# Patient Record
Sex: FEMALE | Race: OTHER | NOT HISPANIC OR LATINO
[De-identification: names, ages, dates, MRNs, and addresses within clinical notes are randomized per-mention and may not be internally consistent; named-entity substitution may affect disease eponyms.]

---

## 2018-03-15 ENCOUNTER — LABORATORY RESULT (OUTPATIENT)
Age: 33
End: 2018-03-15

## 2018-03-15 ENCOUNTER — APPOINTMENT (OUTPATIENT)
Dept: INTERNAL MEDICINE | Facility: CLINIC | Age: 33
End: 2018-03-15
Payer: COMMERCIAL

## 2018-03-15 VITALS
DIASTOLIC BLOOD PRESSURE: 64 MMHG | SYSTOLIC BLOOD PRESSURE: 106 MMHG | HEART RATE: 78 BPM | WEIGHT: 129 LBS | TEMPERATURE: 98.4 F | HEIGHT: 62.5 IN | BODY MASS INDEX: 23.14 KG/M2 | OXYGEN SATURATION: 98 %

## 2018-03-15 DIAGNOSIS — F15.90 OTHER STIMULANT USE, UNSPECIFIED, UNCOMPLICATED: ICD-10-CM

## 2018-03-15 DIAGNOSIS — Z80.3 FAMILY HISTORY OF MALIGNANT NEOPLASM OF BREAST: ICD-10-CM

## 2018-03-15 DIAGNOSIS — Z00.00 ENCOUNTER FOR GENERAL ADULT MEDICAL EXAMINATION W/OUT ABNORMAL FINDINGS: ICD-10-CM

## 2018-03-15 DIAGNOSIS — Z78.9 OTHER SPECIFIED HEALTH STATUS: ICD-10-CM

## 2018-03-15 DIAGNOSIS — Z82.49 FAMILY HISTORY OF ISCHEMIC HEART DISEASE AND OTHER DISEASES OF THE CIRCULATORY SYSTEM: ICD-10-CM

## 2018-03-15 DIAGNOSIS — Z83.3 FAMILY HISTORY OF DIABETES MELLITUS: ICD-10-CM

## 2018-03-15 PROCEDURE — 99385 PREV VISIT NEW AGE 18-39: CPT

## 2018-03-16 LAB
25(OH)D3 SERPL-MCNC: 25.7 NG/ML
ALBUMIN SERPL ELPH-MCNC: 3.7 G/DL
ALP BLD-CCNC: 68 U/L
ALT SERPL-CCNC: 14 U/L
ANION GAP SERPL CALC-SCNC: 15 MMOL/L
APPEARANCE: CLEAR
AST SERPL-CCNC: 16 U/L
BASOPHILS # BLD AUTO: 0.01 K/UL
BASOPHILS NFR BLD AUTO: 0.1 %
BILIRUB SERPL-MCNC: 0.2 MG/DL
BILIRUBIN URINE: NEGATIVE
BLOOD URINE: ABNORMAL
BUN SERPL-MCNC: 13 MG/DL
CALCIUM SERPL-MCNC: 9.2 MG/DL
CHLORIDE SERPL-SCNC: 98 MMOL/L
CHOLEST SERPL-MCNC: 172 MG/DL
CHOLEST/HDLC SERPL: 3 RATIO
CO2 SERPL-SCNC: 23 MMOL/L
COLOR: YELLOW
CREAT SERPL-MCNC: 0.79 MG/DL
EOSINOPHIL # BLD AUTO: 0.07 K/UL
EOSINOPHIL NFR BLD AUTO: 0.8 %
GLUCOSE QUALITATIVE U: NEGATIVE MG/DL
GLUCOSE SERPL-MCNC: 88 MG/DL
HAV IGG+IGM SER QL: REACTIVE
HBV SURFACE AB SER QL: REACTIVE
HBV SURFACE AG SER QL: NONREACTIVE
HCT VFR BLD CALC: 37.3 %
HDLC SERPL-MCNC: 57 MG/DL
HGB BLD-MCNC: 12.5 G/DL
HIV1+2 AB SPEC QL IA.RAPID: NONREACTIVE
IMM GRANULOCYTES NFR BLD AUTO: 0.2 %
KETONES URINE: NEGATIVE
LDLC SERPL CALC-MCNC: 86 MG/DL
LEUKOCYTE ESTERASE URINE: NEGATIVE
LYMPHOCYTES # BLD AUTO: 3.5 K/UL
LYMPHOCYTES NFR BLD AUTO: 39.5 %
MAN DIFF?: NORMAL
MCHC RBC-ENTMCNC: 29.2 PG
MCHC RBC-ENTMCNC: 33.5 GM/DL
MCV RBC AUTO: 87.1 FL
MEV IGG FLD QL IA: 57.2 AU/ML
MEV IGG+IGM SER-IMP: POSITIVE
MONOCYTES # BLD AUTO: 0.49 K/UL
MONOCYTES NFR BLD AUTO: 5.5 %
MUV AB SER-ACNC: NEGATIVE
MUV IGG SER QL IA: <5 AU/ML
NEUTROPHILS # BLD AUTO: 4.77 K/UL
NEUTROPHILS NFR BLD AUTO: 53.9 %
NITRITE URINE: NEGATIVE
PH URINE: 6
PLATELET # BLD AUTO: 349 K/UL
POTASSIUM SERPL-SCNC: 4.4 MMOL/L
PROT SERPL-MCNC: 7.5 G/DL
PROTEIN URINE: NEGATIVE MG/DL
RBC # BLD: 4.28 M/UL
RBC # FLD: 12.9 %
RUBV IGG FLD-ACNC: 12.4 INDEX
RUBV IGG SER-IMP: POSITIVE
SODIUM SERPL-SCNC: 136 MMOL/L
SPECIFIC GRAVITY URINE: 1.02
T PALLIDUM AB SER QL IA: NEGATIVE
TRIGL SERPL-MCNC: 143 MG/DL
UROBILINOGEN URINE: NEGATIVE MG/DL
VZV AB TITR SER: NEGATIVE
VZV IGG SER IF-ACNC: 29.7 INDEX
WBC # FLD AUTO: 8.86 K/UL

## 2018-03-19 LAB
ADJUSTED MITOGEN: >10 IU/ML
ADJUSTED TB AG: 0.01 IU/ML
M TB IFN-G BLD-IMP: NEGATIVE
QUANTIFERON GOLD NIL: 0.05 IU/ML

## 2018-03-28 ENCOUNTER — FORM ENCOUNTER (OUTPATIENT)
Age: 33
End: 2018-03-28

## 2018-03-29 ENCOUNTER — APPOINTMENT (OUTPATIENT)
Dept: ULTRASOUND IMAGING | Facility: CLINIC | Age: 33
End: 2018-03-29
Payer: COMMERCIAL

## 2018-03-29 ENCOUNTER — OUTPATIENT (OUTPATIENT)
Dept: OUTPATIENT SERVICES | Facility: HOSPITAL | Age: 33
LOS: 1 days | End: 2018-03-29
Payer: COMMERCIAL

## 2018-03-29 DIAGNOSIS — Z00.8 ENCOUNTER FOR OTHER GENERAL EXAMINATION: ICD-10-CM

## 2018-03-29 DIAGNOSIS — N63.0 UNSPECIFIED LUMP IN UNSPECIFIED BREAST: ICD-10-CM

## 2018-03-29 PROCEDURE — 76641 ULTRASOUND BREAST COMPLETE: CPT | Mod: 26,50

## 2018-03-29 PROCEDURE — 76641 ULTRASOUND BREAST COMPLETE: CPT

## 2018-04-11 ENCOUNTER — RESULT REVIEW (OUTPATIENT)
Age: 33
End: 2018-04-11

## 2018-04-11 ENCOUNTER — APPOINTMENT (OUTPATIENT)
Dept: BREAST CENTER | Facility: CLINIC | Age: 33
End: 2018-04-11
Payer: COMMERCIAL

## 2018-04-11 VITALS
HEART RATE: 77 BPM | DIASTOLIC BLOOD PRESSURE: 65 MMHG | BODY MASS INDEX: 24.48 KG/M2 | SYSTOLIC BLOOD PRESSURE: 98 MMHG | HEIGHT: 62 IN | TEMPERATURE: 98.9 F | OXYGEN SATURATION: 99 % | WEIGHT: 133 LBS

## 2018-04-11 DIAGNOSIS — Z80.3 FAMILY HISTORY OF MALIGNANT NEOPLASM OF BREAST: ICD-10-CM

## 2018-04-11 DIAGNOSIS — Z78.9 OTHER SPECIFIED HEALTH STATUS: ICD-10-CM

## 2018-04-11 PROCEDURE — 99243 OFF/OP CNSLTJ NEW/EST LOW 30: CPT

## 2018-06-19 ENCOUNTER — OTHER (OUTPATIENT)
Age: 33
End: 2018-06-19

## 2018-07-12 ENCOUNTER — APPOINTMENT (OUTPATIENT)
Dept: INTERNAL MEDICINE | Facility: CLINIC | Age: 33
End: 2018-07-12

## 2018-10-11 ENCOUNTER — APPOINTMENT (OUTPATIENT)
Dept: INTERNAL MEDICINE | Facility: CLINIC | Age: 33
End: 2018-10-11
Payer: COMMERCIAL

## 2018-10-11 ENCOUNTER — APPOINTMENT (OUTPATIENT)
Dept: NEUROLOGY | Facility: CLINIC | Age: 33
End: 2018-10-11
Payer: COMMERCIAL

## 2018-10-11 VITALS
HEART RATE: 77 BPM | BODY MASS INDEX: 24.84 KG/M2 | DIASTOLIC BLOOD PRESSURE: 60 MMHG | WEIGHT: 135 LBS | TEMPERATURE: 98.6 F | SYSTOLIC BLOOD PRESSURE: 100 MMHG | HEIGHT: 62 IN | OXYGEN SATURATION: 98 %

## 2018-10-11 VITALS
WEIGHT: 135 LBS | HEART RATE: 77 BPM | OXYGEN SATURATION: 98 % | TEMPERATURE: 98.6 F | DIASTOLIC BLOOD PRESSURE: 60 MMHG | BODY MASS INDEX: 24.69 KG/M2 | SYSTOLIC BLOOD PRESSURE: 100 MMHG

## 2018-10-11 DIAGNOSIS — Z84.89 FAMILY HISTORY OF OTHER SPECIFIED CONDITIONS: ICD-10-CM

## 2018-10-11 PROCEDURE — 99203 OFFICE O/P NEW LOW 30 MIN: CPT

## 2018-10-11 PROCEDURE — 99215 OFFICE O/P EST HI 40 MIN: CPT

## 2018-10-11 NOTE — ASSESSMENT
[FreeTextEntry1] : pt has worsening of her headaches and they are new onset  she will have  labs evaluated .she doesn’t have any SNOOP signs  and has recent eye exam as well.  She likely has cluster headaches  she does have tearing of her eyes   as well .  She is not having symptoms of a migraine  , she doesn’t have papilledema   or signs of  hemorrhage.  she should have  mri with and with out contrast for evaluation and if needed mra  to be sure it is not an aneurysm.  she is to go to er if her headache worsens.  Headache is among the most common medical complaints. An overview of the approach to the patient with a chief complaint of headache is presented here. The approach to adults presenting with headache in the emergency department is reviewed elsewhere. (See "Evaluation of the adult with nontraumatic headache in the emergency department".)\par \par The clinical features and management of specific primary headache syndromes are discussed separately. (See "Pathophysiology, clinical manifestations, and diagnosis of migraine in adults" and "Tension-type headache in adults: Pathophysiology, clinical features, and diagnosis" and "Cluster headache: Epidemiology, clinical features, and diagnosis".) \par \par EPIDEMIOLOGY AND CLASSIFICATION — As many as 90 percent of all primary headaches fall under a few categories, including migraine, tension-type, and cluster headache. While episodic tension-type headache (TTH) is the most frequent headache type in population-based studies, migraine is the most common diagnosis in patients presenting to primary care physicians with headache. The one-year prevalence of episodic TTH is approximately 65 percent (see "Tension-type headache in adults: Pathophysiology, clinical features, and diagnosis", section on 'Epidemiology'), but most people with TTH do not present to clinicians for care. As an example, a study of two primary care units in [place] found that migraine was the most prevalent primary headache disorder, accounting for 45 percent of patients reporting headache as a single symptom [1]. Similarly, in the multinational [place] study, which evaluated 1203 patients visiting a primary practitioner with a complaint of episodic headache, migraine was present in 94 percent of the subjects, while TTH, by definition episodic, was present in 3 percent [2].\par \par Cluster headache typically leads to significant disability and most of these patients will come to medical attention. However, cluster headache remains an uncommon diagnosis in primary care settings because of overall low prevalence in the general population (<1 percent). (See "Cluster headache: Epidemiology, clinical features, and diagnosis", section on 'Epidemiology'.)\par \par Clinicians can easily become familiar with the most common primary headache disorders and how to distinguish them (table 1).\par \par Migraine — Migraine is a disorder of recurrent attacks. The headache of migraine is often but not always unilateral and tends to have a throbbing or pulsatile quality. Accompanying features may include nausea, vomiting, photophobia, or phonophobia during attacks (table 2). (See "Pathophysiology, clinical manifestations, and diagnosis of migraine in adults".)\par \par Migraine trigger factors (table 3) may include stress, menstruation, visual stimuli, weather changes, nitrates, fasting, wine, sleep disturbances, and aspartame, among others. (See "Pathophysiology, clinical manifestations, and diagnosis of migraine in adults", section on 'Precipitating and exacerbating factors'.)\par \par Tension-type headache — The typical presentation of a TTH attack is that of a mild to moderate intensity, bilateral, nonthrobbing headache without other associated features. Pure TTH is a rather featureless headache. (See "Tension-type headache in adults: Pathophysiology, clinical features, and diagnosis".)\par \par Cluster headache — Cluster headache belongs to a group of idiopathic headache entities, the trigeminal autonomic cephalalgias (table 4), all of which involve unilateral, often severe headache attacks and typical accompanying autonomic symptoms. Cluster headache is characterized by attacks of severe unilateral orbital, supraorbital, or temporal pain accompanied by autonomic phenomena. Unilateral autonomic symptoms are ipsilateral to the pain and may include ptosis, miosis, lacrimation, conjunctival injection, rhinorrhea, and nasal congestion. Attacks usually last 15 to 180 minutes. (See "Cluster headache: Epidemiology, clinical features, and diagnosis".)\par \par Cluster headache may sometimes be confused with a life-threatening headache, since the pain from a cluster headache can reach full intensity within minutes. However, cluster headache is transient, usually lasting less than one to two hours.\par \par Secondary headache — Clinicians who evaluate patients with headache should be alert to signs that suggest a serious underlying disorder. (See 'Danger signs' below.)\par \par In the [place] primary care study, 39 percent of patients presenting with headache had a headache that was due to a systemic disorder (most commonly fever, acute hypertension, and sinusitis), and 5 percent had a headache that was due to a neurologic disorder (most commonly posttraumatic headache, headaches secondary to cervical spine disease, and expansive intracranial processes) [1].\par \par EVALUATION — The appropriate evaluation of headache complaints includes the following:\par \par ?Rule out serious underlying pathology and look for other secondary causes of headache (algorithm 1). \par \par \par ?Determine the type of primary headache using the patient history as the primary diagnostic tool (table 1). There may be overlap in symptoms, particularly between migraine and tension-type headache (TTH) and between migraine and some secondary causes of headache such as sinus disease. \par \par \par A systematic case history is the single most important factor in establishing a headache diagnosis and determining the future work-up and treatment plan. Imaging is not necessary in the vast majority of patients presenting with headache. Nevertheless, brain imaging is warranted in the patients with danger signs suggesting a secondary cause of headache. (See 'Indications for imaging' below.)\par \par History and examination — A thorough history can focus the physical examination and determine the need for further investigations and imaging exams. A systematic history should include the following:\par \par ?Age at onset\par \par ?Presence or absence of aura and prodrome\par \par ?Frequency, intensity, and duration of attack\par \par ?Number of headache days per month\par \par ?Time and mode of onset\par \par ?Quality, site, and radiation of pain\par \par ?Associated symptoms and abnormalities\par \par ?Family history of migraine\par \par ?Precipitating and relieving factors\par \par ?Exacerbation or relief with change in position (eg, lying flat versus upright)\par \par ?Effect of activity on pain\par \par ?Relationship with food/alcohol\par \par ?Response to any previous treatment\par \par ?Review of current medications\par \par ?Any recent change in vision\par \par ?Association with recent trauma\par \par ?Any recent changes in sleep, exercise, weight, or diet\par \par ?State of general health\par \par ?Change in work or lifestyle (disability)\par \par ?Change in method of birth control (women)\par \par ?Possible association with environmental factors\par \par ?Effects of menstrual cycle and exogenous hormones (women)\par \par \par The examination of an adult with headache complaints should cover the following areas:\par \par ?Obtain blood pressure and pulse\par \par ?Listen for bruit at neck, eyes, and head for clinical signs of arteriovenous malformation\par \par ?Palpate the head, neck, and shoulder regions\par \par ?Check temporal and neck arteries\par \par ?Examine the spine and neck muscles\par \par \par The neurologic examination should cover mental status testing, cranial nerve examination, funduscopy and otoscopy, and symmetry on motor, reflex, cerebellar (coordination), and sensory tests. Gait examination should include getting up from a seated position without any support and walking on tiptoes and heels, tandem gait, and Romberg test.\par \par Low-risk features — The following features can serve as indicators of patients who are unlikely to have serious underlying cause for headache [3,4]:\par \par ?Age =50 years\par \par ?Features typical of primary headaches (table 1)\par \par ?History of similar headache\par \par ?No abnormal neurologic findings\par \par ?No concerning change in usual headache pattern\par \par ?No high-risk comorbid conditions\par \par ?No new or concerning findings on history or examination\par \par \par Patients with headache who meet these criteria do not require imaging.\par \par The majority of patients with headache complaints have a completely normal physical and neurologic examination. However, some types of primary headache may be associated with specific abnormalities:\par \par ?With TTH, there may be pericranial muscle tenderness.\par \par \par ?With migraine, there may be manifestations related to sensitization of primary nociceptors and central trigeminovascular neurons, such as hyperalgesia and allodynia.\par \par \par ?With hemicrania continua or one of the other trigeminal autonomic cephalalgias (cluster headache, paroxysmal hemicrania, and short-lasting unilateral neuralgiform headache attacks), there may be evidence of autonomic activation.\par \par \par Other abnormalities on examination should raise suspicion for a secondary headache disorder. Likewise, danger signs (ie, red flags) should prompt further evaluation, as discussed in the sections below. (See 'Danger signs' below.)\par \par Features suggesting migraine — The most common headache syndromes frequently present with characteristic symptoms (table 1). However, there may be considerable symptom overlap; one population-based survey found that less than one-half of patients who complained of headaches that met criteria for migraine were properly diagnosed [5]. Migraine symptoms may also overlap with other causes of headache. As an example, a significant number of patients with migraine may have nasal symptoms that suggest sinus disease [6]; in addition, a study of primary care patients with recurrent sinus headache found that 90 percent experienced attacks that met the International Headache Society criteria for migraine [7]. (See 'Sinus symptoms' below.)\par \par Given these pitfalls, a number of diagnostic instruments have been proposed, mainly to assist with the diagnosis of migraine, the most common primary headache syndrome in patients presenting to primary care physicians. One such instrument (ID Migraine) preselects eligible subjects as those who had two or more headaches in the previous three months and indicated either that they might want to speak with a health care professional about their headaches or that they experienced a headache that limited their ability to work, study, or enjoy life [8]. The screen employs three questions:\par \par During the last three months, did you have the following with your headaches?\par \par ?You felt nauseated or sick to your stomach.\par \par ?Light bothered you (a lot more than when you do not have headaches).\par \par ?Your headaches limited your ability to work, study, or do what you needed to do for at least one day.\par \par \par The ID Migraine screen is positive if the patient answers "yes" to two of the three items. In a systematic review of 13 studies that involved over 5800 patients, the pooled sensitivity and specificity of ID Migraine was 0.84 and 0.76, respectively [9]. A positive ID Migraine increased the pretest probability of migraine from 59 to 84 percent, whereas a negative ID Migraine score reduced the probability of migraine from 59 to 23 percent.\par \par Another simple and validated instrument, the brief headache screen, consists of three to six questions [10]. One version includes the following four questions:\par \par ?How often do you get severe headaches (ie, without treatment it is difficult to function)?\par \par ?How often do you get other (milder) headaches?\par \par ?How often do you take headache relievers or pain pills?\par \par ?Has there been any recent change in your headaches?\par \par \par In one study, the presence of episodic disabling headache correctly identified migraine in 136 of 146 patients (93 percent) with episodic migraine, and 154 of 197 patients (78 percent) with chronic headache with migraine, with a specificity of 63 percent [10]. Only 6 of 343 patients (2 percent) with migraine were not identified by disabling headache. Thus, virtually any patient with severe episodic headaches can be considered to have migraine.\par \par Among the questions above, the second on frequency of headache and the third on the need for pain pills may be helpful for identifying patients with medication overuse (eg, patients who use symptomatic medications more than three days per week and/or who have daily headaches). The last question about recent changes in the headache is particularly helpful for identifying patients who may have an important secondary cause of headache; a patient with a stable pattern of headache for six months is unlikely to have a serious underlying cause.\par \par Danger signs — Paying attention to danger signs is important since headaches may be the presenting symptom of a space-occupying mass or vascular lesion, infection, metabolic disturbance, or a systemic problem. The following features in the history can serve as warning signs of possible serious underlying disease [11-13]. (See "Evaluation of the adult with nontraumatic headache in the emergency department".)\par \par The mnemonic SNOOP is a reminder of the danger signs ("red flags") for the presence of serious underlying disorders that can cause acute or subacute headache [14,15]:\par \par ?Systemic symptoms, illness, or condition (eg, fever, weight loss, cancer, pregnancy, immunocompromised state, including human immunodeficiency virus [HIV])\par \par \par ?Neurologic symptoms or abnormal signs (eg, confusion, impaired alertness or consciousness, papilledema, focal neurologic symptoms or signs, meningismus, or seizures)\par \par \par ?Onset is new (particularly for age >50 years) or sudden (eg, "thunderclap") \par \par \par ?Other associated conditions or features (eg, head trauma, illicit drug use, or toxic exposure; headache awakens from sleep, is worse with Valsalva maneuvers, or is precipitated by cough, exertion, or sexual activity)\par \par \par ?Previous headache history with headache progression or change in attack frequency, severity, or clinical features\par \par \par Any of these findings should prompt further investigation, including brain imaging with magnetic resonance imaging (MRI) or computed tomography (CT). \par \par Specific features suggesting a secondary headache source — Other features that suggest a specific source of headache pain include the following:\par \par ?Impaired vision or seeing halos around light suggests the presence of glaucoma. Suspicion for subacute angle closure glaucoma should be raised by relatively short duration (often less than one hour) unilateral headaches that do not meet criteria for migraine arising after age 50 [16].\par \par \par ?Visual field defects suggest the presence of a lesion of the optic pathway (eg, due to a pituitary mass).\par \par \par ?Sudden, severe, unilateral vision loss suggests the presence of optic neuritis. Optic neuritis typically presents with painful, monocular visual loss that evolves over several hours to a few days. One-third of patients have visible optic nerve inflammation (papillitis) on funduscopic examination. (See "Optic neuritis: Pathophysiology, clinical features, and diagnosis".)\par \par \par ?Blurring of vision on forward bending of the head, headaches upon waking early in the morning that improve with sitting up, and double vision or loss of coordination and balance should raise the suspicion of raised intracranial pressure (ICP); this should also be considered in patients with chronic, daily, progressively worsening headaches associated with chronic nausea. Idiopathic intracranial hypertension (pseudotumor cerebri) typically affects obese women of child-bearing age. Characteristic features are headache, papilledema, vision loss or diplopia, elevated lumbar puncture (LP) opening pressure with normal cerebrospinal fluid (CSF) composition. (See "Evaluation and management of elevated intracranial pressure in adults" and "Idiopathic intracranial hypertension (pseudotumor cerebri): Clinical features and diagnosis".)\par \par \par ?In patients who present with headache that is relieved with recumbency and exacerbated with upright posture, the diagnosis of headache attributed to spontaneous intracranial hypotension should be considered. An additional major feature of this headache syndrome is diffuse meningeal enhancement on brain MRI. The accepted etiology is CSF leakage, which may occur in the context of rupture of an arachnoid membrane. (See "Spontaneous intracranial hypotension: Pathophysiology, clinical features, and diagnosis".)\par \par \par ?The presence of nausea, vomiting, worsening of headache with changes in body position (particularly bending over), an abnormal neurologic examination, and/or a significant change in prior headache pattern suggests the headache was caused by a tumor. (See "Overview of the clinical features and diagnosis of brain tumors in adults".)\par \par \par ?Intermittent headache with generalized sweating, tachycardia, and/or sustained or paroxysmal hypertension is suggestive of pheochromocytoma. (See "Clinical presentation and diagnosis of pheochromocytoma".)\par \par \par ?Morning headache is nonspecific and can occur as part of a primary headache syndrome or may be secondary to a number of disorders including sleep apnea, chronic obstructive pulmonary disease, and the obesity hypoventilation syndrome. (See "Clinical presentation and diagnosis of obstructive sleep apnea in adults" and "Chronic obstructive pulmonary disease: Definition, clinical manifestations, diagnosis, and staging" and "Clinical manifestations and diagnosis of obesity hypoventilation syndrome".)\par \par \par Need for emergency evaluation — A small proportion of patients present with serious or life-threatening headaches that require referral for emergency diagnosis and treatment. These include: \par \par ?Sudden onset "thunderclap" headache – Severe headache of sudden onset (ie, that reaches maximal intensity within a few seconds or less than one minute after the onset of pain) is known as thunderclap headache because its explosive and unexpected nature is likened to a "clap of thunder." Thunderclap headache requires urgent evaluation as such headaches may be harbingers of subarachnoid hemorrhage and other potentially ominous etiologies (table 5). (See "Approach to the patient with thunderclap headache".)\par \par \par ?Acute or subacute neck pain or headache with Adsha syndrome and/or neurologic deficit – Cervical artery dissection is usually associated with local symptoms including neck pain or headache, and often results in ischemic stroke or transient ischemic attack. Dasha syndrome is seen in approximately 39 percent of those with carotid and 13 percent of those with vertebral artery dissection [17,18].\par \par \par ?Headache with suspected meningitis or encephalitis – Fever, altered mental status, with or without nuchal rigidity may indicate central nervous system infection.\par \par \par ?Headache with global or focal neurologic deficit or papilledema – Headache is the primary symptom of increased ICP, which should be suspected when accompanied by bilateral papilledema, focal neurologic deficit, or repeated episodes of nausea and vomiting.\par \par \par ?Headache with orbital or periorbital symptoms – Headache with visual impairment, periorbital pain, or ophthalmoplegia could indicate acute angle closure glaucoma, infection, inflammation, or tumor involving the orbits.\par \par \par ?Headache and possible carbon monoxide exposure.\par \par \par The evaluation of the adult with headache in the emergency department is described elsewhere. Laboratory tests, imaging, and LP for CSF analysis may be included in the evaluation. (See "Evaluation of the adult with nontraumatic headache in the emergency department".) \par \par Imaging — CT or MRI are the common modalities used to diagnose many causes of secondary headache. Choice of exact body part (eg, head, neck, face, etc) and use of contrast varies with clinical scenario.\par \par Indications for imaging — Patients with the danger signs or other features suggesting a secondary headache source will require imaging (See 'Danger signs' above and 'Specific features suggesting a secondary headache source' above.). \par \par Imaging is usually not warranted for patients with a stable migraine pattern and a normal neurologic examination, although a lower threshold for imaging is reasonable for patients with atypical migraine features or in patients who do not fulfill the strict definition of migraine [19]. As an example, imaging is indicated for patients presenting with recent-onset headache that is featureless (ie, bilateral, non-throbbing, without nausea and without sensitivity to light, sound, or smell) [20,21]. However, imaging for no other reason than reassurance is sometimes performed in clinical practice. It is important that the clinician provide the patient with a clear explanation of both the diagnosis and the reason for imaging, especially if it is being performed in someone suspected of having primary headache [20]. The patient should also be informed that incidental findings (eg, vascular lesion, small neoplasm) likely unrelated to the headache can be seen in 1 to 2 percent of MRI exams and that there are few data providing guidance as to how they should be managed [22,23].\par \par The vast majority of patients without danger signs do not have a secondary cause of headache [24,25]. As an example, in a study of 373 patients with chronic headache at a tertiary referral center, all had one or more of the following characteristics that prompted referral for head CT scan: increased severity of symptoms or resistance to appropriate drug therapy, change in characteristics or pattern of headache, or family history of an intracranial structural lesion [26]. Only two exams (less than 1 percent) showed potentially significant lesions (one low grade glioma and one aneurysm); only the aneurysm was treated.\par \par Choice of imaging exam — CT scan or MRI of the head is the preferred imaging exam for headache [27]. Choice of modality and need for intravenous (IV) contrast depends upon the clinical indications. For imaging of the vessels, cerebral and cervical angiography using computed tomography (CTA) or magnetic resonance angiography (MRA) is performed as an added exam to head CT or MRI and usually requires IV contrast administration. CTA or MRA exams image the arteries, veins, or both, depending on the indication. Exams tailored for imaging the orbits and ear (encompassing the skull base and pituitary), face, and maxilla (encompassing the paranasal sinuses), or the temporomandibular joint (TMJ) are sometimes added to the head imaging if an underlying diagnosis that localizes anatomically is suspected. Approximate effective radiation dose for a head CT is 2 mSv.\par \par Factors to consider in imaging exam choice is diagnostic performance for suspected diagnosis, availability of the technology and radiologist expertise, and safety considerations. The choice of when to image and with what modality for many suspected etiologies of headache are discussed here and in other related UpToDate topics. In addition, the ACR Appropriateness Criteria provides general guidance for many common clinical scenarios of headache [28]. When the decision is not obvious, consultation with the radiologist is helpful to facilitate patient referral. \par \par Lumbar puncture — LP for CSF analysis is urgently indicated in patients with headache when there is clinical suspicion of subarachnoid hemorrhage in the setting of a negative or normal head CT. In addition, LP is indicated when there is clinical suspicion of an infectious, inflammatory, or neoplastic etiology of headache. These issues are discussed elsewhere. (See "Clinical manifestations and diagnosis of aneurysmal subarachnoid hemorrhage", section on 'Diagnosis of subarachnoid hemorrhage' and "Lumbar puncture: Technique, indications, contraindications, and complications in adults".)\par \par Common clinical scenarios — Patients with a chief complaint of headache accompanied by factors that suggest a serious but not immediately life-threatening diagnosis should be evaluated promptly in the outpatient or inpatient setting. Differences in patient demographics, comorbidities, and headache features can guide the evaluation to help ensure appropriate diagnosis and management. (See 'Danger signs' above and 'Specific features suggesting a secondary headache source' above.)\par \par New or recent onset headache — The absence of similar headaches in the past, when combined with high-risk features, suggests a possible serious disorder. Head MRI without and with contrast should be obtained to evaluate for an intracranial mass lesion (eg, primary or metastatic neoplasm, abscess, hematoma), communicating or obstructive hydrocephalus, or cerebral edema from ischemia or infarction (ie, stroke). If MRI is not available or contraindicated, head CT without and with contrast should be performed instead.\par \par In patients with a new or recent onset of headache, high-risk features include:\par \par ?Older age – New headache in patients older than 50 years may suggest underlying pathology.\par \par \par ?Cancer – New headache type in a patient with cancer suggests metastasis. (See "Evaluation of the adult with nontraumatic headache in the emergency department", section on 'New headache in a cancer patient'.)\par \par \par ?Febrile or with Lyme disease – New headache associated with fever and altered mental status with or without nuchal rigidity can indicate meningitis. New headache in a patient with Lyme disease suggests meningoencephalitis. (See "Evaluation of the adult with nontraumatic headache in the emergency department", section on 'New headache with suspected meningitis or encephalitis' and "Nervous system Lyme disease", section on 'Lyme encephalomyelitis'.)\par \par \par ?Immunosuppression – New headache type in a patient with immunosuppression suggests an opportunistic infection or tumor.\par \par \par Brain tumor is a rare cause of headache but should be considered in patients presenting with focal neurologic signs. It should also be considered when new-onset headaches occur in adults older than 50 years. A prior history of headache does not rule out the possibility of brain tumor, and a change in headache pattern is a diagnostic "red flag." The features of brain tumor headache are generally nonspecific and vary widely with tumor location, size, and rate of growth. The headache is usually bilateral but can be on the side of the tumor. Brain tumor headache often resembles TTH but may resemble migraine or a variety of other headache types. (See "Brain tumor headache".)\par \par In the absence of danger signs, patients who present with a new or recent onset headache and a normal neurologic examination are most likely to have primary headache, such as migraine or TTH (table 1).\par \par Older patients — Older patients are at increased risk for secondary types of headache (eg, giant cell arteritis, trigeminal neuralgia, subdural hematoma, acute herpes zoster and postherpetic neuralgia, and brain tumors) and some types of primary headache (hypnic headache, cough headache, and migraine accompaniments) [29]. Need for imaging depends on the suspected diagnosis. Diagnostic consideration include:\par \par ?Giant cell (temporal) arteritis (GCA) is a chronic vasculitis of large and medium sized vessels. The disease seldom occurs before age 50 years, and its incidence rises steadily thereafter. A new type of headache occurs in two-thirds of affected individuals. The head pain tends to be located over the temporal areas but can be frontal or occipital in location. The headaches may be mild or severe. Other common symptoms can include fever, fatigue, weight loss, jaw claudication, visual symptoms, particularly transient monocular visual loss and diplopia, and symptoms of polymyalgia rheumatica. Laboratory testing may reveal an elevated erythrocyte sedimentation rate and/or serum C-reactive protein, but these are not specific. The diagnosis is based on histopathology or imaging exams. Histopathologic evidence of GCA is most often acquired by temporal artery biopsy. It may be possible to substitute for color Doppler ultrasound of the head, as performed by experienced operators, for temporal artery biopsy as a diagnostic procedure in the appropriate clinical setting. (See "Clinical manifestations of giant cell arteritis" and "Diagnosis of giant cell arteritis".)\par \par \par ?Trigeminal neuralgia is defined by sudden, usually unilateral, severe, brief, stabbing or lancinating, recurrent episodes of pain in the distribution of one or more branches of the fifth cranial (trigeminal) nerve. The incidence increases gradually with age; most idiopathic cases begin after age 50 years. Once the diagnosis is suspected on clinical grounds, it is important to search for secondary causes. Patients with suspected trigeminal neuralgia or those with recurrent attacks of pain limited to one or more divisions of the trigeminal nerve and no obvious cause (eg, herpes zoster or trigeminal nerve trauma) should undergo imaging to help distinguish classic trigeminal neuralgia from secondary causes. MRI and MRA of the head without and with contrast tailored to evaluate the trigeminal nerve is the preferred imaging exam to evaluate for compression of the nerve by adjacent vessels or other structures. (See "Trigeminal neuralgia".)\par \par \par ?Chronic subdural hematoma may present with the insidious onset of headaches, light-headedness, cognitive impairment, apathy, somnolence, and occasionally seizures. Imaging with noncontrast CT or MRI is essential to confirm the diagnosis. (See "Subdural hematoma in adults: Etiology, clinical features, and diagnosis".)\par \par \par ?Acute herpes zoster and postherpetic neuralgia often involve cervical and trigeminal nerves. Pain is the most common symptom of zoster and approximately 75 percent of patients have prodromal pain in the dermatome where the rash subsequently appears. The major risk factors for postherpetic neuralgia are older age, greater acute pain, and greater rash severity. Acute herpes zoster is usually a clinical diagnosis based upon the characteristic vesicular lesions in a restricted dermatomal pattern. The diagnosis of postherpetic neuralgia is made when pain persists beyond four months in the same distribution as a preceding documented episode of acute herpes zoster. (See "Clinical manifestations of varicella-zoster virus infection: Herpes zoster" and "Postherpetic neuralgia".)\par \par \par ?Brain tumor should be considered as a possible cause of new-onset headaches in adults over age 50 years, as discussed above. (See 'New or recent onset headache' above and "Brain tumor headache".)\par \par \par ?Hypnic headache, also known as "alarm clock headache," occurs almost exclusively after the age of 50 years and is characterized by episodes of dull head pain, often bilateral, that awaken the sufferer from sleep. The diagnosis requires the exclusion of nocturnal attacks caused by other primary and secondary headaches. Therefore, imaging of the brain, preferably by MRI without and with contrast, should be obtained to look for a structural cause. (See "Hypnic headache".)\par \par \par ?Primary cough headache most often affects people older than age 40 years and is provoked by coughing or straining in the absence of any intracranial disorder. Patients presenting with de abe headache precipitated by coughing should have imaging, preferably brain MRI without and with contrast, to exclude a structural lesion. (See "Primary cough headache".)\par \par \par Pregnancy — New headache or change in headache during pregnancy may be due to migraine or another primary headache, but many other conditions can present with headache at this time, particularly pre-eclampsia, postdural puncture headache, and cerebral venous thrombosis. Pre-eclampsia must be ruled in or out in every pregnant woman over 20 weeks of gestation with headache. (See "Preeclampsia: Clinical features and diagnosis".)\par \par MRI without contrast is recommended when there is concern for a secondary headache, and MR venography without contrast should be included if cerebral venous sinus thrombosis is a concern. If MRI is not immediately available or contraindicated, head CT without and with contrast can be used to evaluate for hemorrhage, mass effect or hydrocephalus. (See "Headache in pregnant and postpartum women".)\par \par Fever — Fever associated with headache may be caused by intracranial, systemic, or local infection, as well as other etiologies (table 6). Emergency evaluation is indicated if fever is accompanied by symptoms suggestive of meningitis or encephalitis (eg, altered mental status, with or without nuchal rigidity). (See "Evaluation of

## 2018-10-11 NOTE — HISTORY OF PRESENT ILLNESS
[FreeTextEntry8] : Pt states she has a headache in the back of her head  sharp level 5/10 and occurs suddenly and lasts a few minutes and goes away and returns.  it has lasted 30 mins  at times.  She doesn’t have double vision nausea or vomiting blurred vision, hearing loss and has not noticed any triggers.  She states she sometimes has dizziness with the headache.  she has no family history of aneurysms . She doesn’t have neck stiffness or pain.  She takes Excedrin which helps but returns and has to take it several times through the day.  She states a few yrs ago she had headaches and they were different and were frontal  and told she had migraines . The headaches she has now are different.    No hx of seizures.  She is not dieting.  She drinks water regularly and goes to the gym.  No muscle aches or pains, no arthralgia.  No fever or chills no head trauma or recent accident or fall.  No chest pain or palpitations.  No family hx of brain tumors. No weakness  and the pain doesn’t wake her up during the night.  the pain is left sided

## 2018-10-11 NOTE — PHYSICAL EXAM
[Well Developed] : well developed [Well Nourished] : well nourished [Normal Voice Quality] : was normal [Normal Verbal Skills] : the patient had normal verbal communication skills [Normal Nonverbal Skills] : normal nonverbal communication skills were demonstrated [Conjunctiva] : the conjunctiva were normal in both eyes [PERRL] : pupils were equal in size, round, and reactive to light [EOM Intact] : extraocular movements were intact [Normal Outer Ear/Nose] : the outer ears and nose were normal in appearance [Normal Oropharynx] : the oropharynx was normal [Normal TMs] : both tympanic membranes were normal [Normal Nasal Mucosa] : the nasal mucosa was normal [Normal Appearance] : was normal in appearance [Neck Supple] : was supple [Enlarged Diffusely] : was not enlarged [Rate ___] : at [unfilled] breaths per minute [Normal Rhythm/Effort] : normal respiratory rhythm and effort [Clear Bilaterally] : the lungs were clear to auscultation bilaterally [Normal to Percussion] : the lungs were normal to percussion [5th Left ICS - MCL] : palpated at the 5th LICS in the midclavicular line [Heart Rate ___] : [unfilled] bpm [Rhythm Regular] : regular [II] : a grade 2 [Crescendo-Decrescendo] : crescendo-decrescendo [Low] : low pitched [Blowing] : blowing [Bruit] : no bruit heard [Normal Rate] : normal [Normal S1] : normal S1 [Normal S2] : normal S2 [S3] : no S3 [S4] : no S4 [No Pitting Edema] : no pitting edema present [Rt] : no varicose veins of the right leg [Lt] : no varicose veins of the left leg [Right Carotid Bruit] : no bruit heard over the right carotid [Left Carotid Bruit] : no bruit heard over the left carotid [Right Femoral Bruit] : no bruit heard over the right femoral artery [Left Femoral Bruit] : no bruit heard over the left femoral artery [2+] : left 2+ [No Abnormalities] : the abdominal aorta was not enlarged and no bruit was heard [Soft, Nontender] : the abdomen was soft and nontender [No Mass] : no masses were palpated [No HSM] : no hepatosplenomegaly noted [None] : no CVA tenderness [Postauricular Lymph Nodes Enlarged Bilaterally] : nodes not enlarged [Preauricular Lymph Nodes Enlarged Bilaterally] : nodes not enlarged [Submandibular Lymph Nodes Enlarged Bilaterally] : nodes not enlarged [Suboccipital Lymph Nodes Enlarged Bilaterally] : nodes not enlarged [Submental Lymph Nodes Enlarged] : nodes not enlarged [Cervical Lymph Nodes Enlarged Posterior Bilaterally] : nodes not enlarged [Cervical Lymph Nodes Enlarged Anterior Bilaterally] : nodes not enlarged [Supraclavicular Lymph Nodes Enlarged Bilaterally] : nodes not enlarged [Axillary Lymph Nodes Enlarged Bilaterally] : nodes not enlarged [Epitrochlear Lymph Nodes Enlarged Bilaterally] : nodes not enlarged [Femoral Lymph Nodes Enlarged Bilaterally] : nodes not enlarged [Inguinal Lymph Nodes Enlarged Bilaterally] : nodes not enlarged [No Joint Swelling] : no joint swelling [Grossly Normal Strength/Tone] : grossly normal strength/tone [No Rash] : no rash [No Skin Lesions] : no skin lesions [Acne] : no acne [Normal Scalp] : inspection of the scalp showed no abnormalities [Examination Of The Hair] : texture and distribution of hair was normal [Complexion Medium] : medium complexion [Multiple Tattoos] : multiple tattoos observed [Normal Gait] : normal gait [Coordination Grossly Intact] : coordination grossly intact [No Focal Deficits] : no focal deficits [Deep Tendon Reflexes (DTR)] : deep tendon reflexes were 2+ and symmetric [Normal] : the deep tendon reflexes were normal [Speech Grossly Normal] : speech grossly normal [Memory Grossly Normal] : memory grossly normal [Alert and Oriented x3] : oriented to person, place, and time [Normal Mood] : the mood was normal [Normal Insight/Judgement] : insight and judgment were intact [Normal Mental Status] : the patient's orientation, memory, attention, language and fund of knowledge were normal [Appropriate] : appropriate [Impaired judgment] : intact judgment [Impaired Insight] : intact insight [de-identified] : tongue normal teeth in good repair [de-identified] : good temporal artery pulsations.

## 2018-10-12 LAB
ALBUMIN SERPL ELPH-MCNC: 4.1 G/DL
ALP BLD-CCNC: 75 U/L
ALT SERPL-CCNC: 16 U/L
ANA SER IF-ACNC: NEGATIVE
ANION GAP SERPL CALC-SCNC: 12 MMOL/L
AST SERPL-CCNC: 20 U/L
BASOPHILS # BLD AUTO: 0.01 K/UL
BASOPHILS NFR BLD AUTO: 0.1 %
BILIRUB SERPL-MCNC: 0.3 MG/DL
BUN SERPL-MCNC: 11 MG/DL
CALCIUM SERPL-MCNC: 9 MG/DL
CHLORIDE SERPL-SCNC: 104 MMOL/L
CHOLEST SERPL-MCNC: 177 MG/DL
CHOLEST/HDLC SERPL: 2.8 RATIO
CO2 SERPL-SCNC: 24 MMOL/L
CREAT SERPL-MCNC: 0.81 MG/DL
CRP SERPL-MCNC: 1.3 MG/DL
EOSINOPHIL # BLD AUTO: 0.09 K/UL
EOSINOPHIL NFR BLD AUTO: 0.9 %
ERYTHROCYTE [SEDIMENTATION RATE] IN BLOOD BY WESTERGREN METHOD: 7 MM/HR
FOLATE SERPL-MCNC: 12.8 NG/ML
GLUCOSE SERPL-MCNC: 89 MG/DL
HCG SERPL-MCNC: <1 MIU/ML
HCT VFR BLD CALC: 38 %
HDLC SERPL-MCNC: 64 MG/DL
HGB BLD-MCNC: 12.4 G/DL
IMM GRANULOCYTES NFR BLD AUTO: 0.1 %
LDLC SERPL CALC-MCNC: 96 MG/DL
LYMPHOCYTES # BLD AUTO: 3.16 K/UL
LYMPHOCYTES NFR BLD AUTO: 33.2 %
MAGNESIUM SERPL-MCNC: 2 MG/DL
MAN DIFF?: NORMAL
MCHC RBC-ENTMCNC: 28.6 PG
MCHC RBC-ENTMCNC: 32.6 GM/DL
MCV RBC AUTO: 87.8 FL
MONOCYTES # BLD AUTO: 0.34 K/UL
MONOCYTES NFR BLD AUTO: 3.6 %
NEUTROPHILS # BLD AUTO: 5.92 K/UL
NEUTROPHILS NFR BLD AUTO: 62.1 %
PLATELET # BLD AUTO: 325 K/UL
POTASSIUM SERPL-SCNC: 4.5 MMOL/L
PROT SERPL-MCNC: 7 G/DL
RBC # BLD: 4.33 M/UL
RBC # FLD: 12.9 %
SODIUM SERPL-SCNC: 140 MMOL/L
TRIGL SERPL-MCNC: 83 MG/DL
VIT B12 SERPL-MCNC: 335 PG/ML
WBC # FLD AUTO: 9.53 K/UL

## 2018-10-15 ENCOUNTER — FORM ENCOUNTER (OUTPATIENT)
Age: 33
End: 2018-10-15

## 2018-10-16 ENCOUNTER — OUTPATIENT (OUTPATIENT)
Dept: OUTPATIENT SERVICES | Facility: HOSPITAL | Age: 33
LOS: 1 days | End: 2018-10-16
Payer: COMMERCIAL

## 2018-10-16 ENCOUNTER — APPOINTMENT (OUTPATIENT)
Dept: MRI IMAGING | Facility: HOSPITAL | Age: 33
End: 2018-10-16
Payer: COMMERCIAL

## 2018-10-16 ENCOUNTER — TRANSCRIPTION ENCOUNTER (OUTPATIENT)
Age: 33
End: 2018-10-16

## 2018-10-16 DIAGNOSIS — R51 HEADACHE: ICD-10-CM

## 2018-10-16 PROCEDURE — 70551 MRI BRAIN STEM W/O DYE: CPT | Mod: 26

## 2018-10-16 PROCEDURE — 70551 MRI BRAIN STEM W/O DYE: CPT

## 2018-10-24 ENCOUNTER — APPOINTMENT (OUTPATIENT)
Dept: CARDIOLOGY | Facility: CLINIC | Age: 33
End: 2018-10-24
Payer: COMMERCIAL

## 2018-10-24 VITALS
HEIGHT: 62 IN | DIASTOLIC BLOOD PRESSURE: 64 MMHG | OXYGEN SATURATION: 98 % | WEIGHT: 135 LBS | HEART RATE: 73 BPM | SYSTOLIC BLOOD PRESSURE: 121 MMHG | BODY MASS INDEX: 24.84 KG/M2 | TEMPERATURE: 98.5 F

## 2018-10-24 PROCEDURE — 93000 ELECTROCARDIOGRAM COMPLETE: CPT

## 2018-10-24 PROCEDURE — 99244 OFF/OP CNSLTJ NEW/EST MOD 40: CPT

## 2018-10-25 ENCOUNTER — APPOINTMENT (OUTPATIENT)
Dept: INTERNAL MEDICINE | Facility: CLINIC | Age: 33
End: 2018-10-25

## 2018-10-27 ENCOUNTER — APPOINTMENT (OUTPATIENT)
Dept: ULTRASOUND IMAGING | Facility: CLINIC | Age: 33
End: 2018-10-27
Payer: COMMERCIAL

## 2018-10-27 ENCOUNTER — OUTPATIENT (OUTPATIENT)
Dept: OUTPATIENT SERVICES | Facility: HOSPITAL | Age: 33
LOS: 1 days | End: 2018-10-27
Payer: COMMERCIAL

## 2018-10-27 DIAGNOSIS — N63.0 UNSPECIFIED LUMP IN UNSPECIFIED BREAST: ICD-10-CM

## 2018-10-27 PROCEDURE — 76642 ULTRASOUND BREAST LIMITED: CPT | Mod: 26,LT

## 2018-10-27 PROCEDURE — 76642 ULTRASOUND BREAST LIMITED: CPT

## 2018-10-31 ENCOUNTER — APPOINTMENT (OUTPATIENT)
Dept: INTERNAL MEDICINE | Facility: CLINIC | Age: 33
End: 2018-10-31
Payer: COMMERCIAL

## 2018-10-31 ENCOUNTER — APPOINTMENT (OUTPATIENT)
Dept: NEUROLOGY | Facility: CLINIC | Age: 33
End: 2018-10-31
Payer: COMMERCIAL

## 2018-10-31 VITALS
TEMPERATURE: 98.4 F | BODY MASS INDEX: 24.84 KG/M2 | WEIGHT: 135 LBS | DIASTOLIC BLOOD PRESSURE: 70 MMHG | HEART RATE: 68 BPM | HEIGHT: 62 IN | OXYGEN SATURATION: 99 % | SYSTOLIC BLOOD PRESSURE: 110 MMHG

## 2018-10-31 VITALS
HEIGHT: 62 IN | WEIGHT: 135 LBS | HEART RATE: 68 BPM | SYSTOLIC BLOOD PRESSURE: 110 MMHG | DIASTOLIC BLOOD PRESSURE: 70 MMHG | OXYGEN SATURATION: 99 % | BODY MASS INDEX: 24.84 KG/M2 | TEMPERATURE: 98.4 F

## 2018-10-31 PROCEDURE — 99212 OFFICE O/P EST SF 10 MIN: CPT

## 2018-10-31 PROCEDURE — 99214 OFFICE O/P EST MOD 30 MIN: CPT

## 2018-10-31 NOTE — PHYSICAL EXAM
[No Acute Distress] : no acute distress [Well Nourished] : well nourished [Well Developed] : well developed [Well-Appearing] : well-appearing [Normal Sclera/Conjunctiva] : normal sclera/conjunctiva [PERRL] : pupils equal round and reactive to light [EOMI] : extraocular movements intact [Normal Outer Ear/Nose] : the outer ears and nose were normal in appearance [Normal Oropharynx] : the oropharynx was normal [No JVD] : no jugular venous distention [Supple] : supple [No Lymphadenopathy] : no lymphadenopathy [No Respiratory Distress] : no respiratory distress  [Clear to Auscultation] : lungs were clear to auscultation bilaterally [No Accessory Muscle Use] : no accessory muscle use [Normal Rate] : normal rate  [Regular Rhythm] : with a regular rhythm [Normal S1, S2] : normal S1 and S2 [No Carotid Bruits] : no carotid bruits [No Abdominal Bruit] : a ~M bruit was not heard ~T in the abdomen [No Varicosities] : no varicosities [No Edema] : there was no peripheral edema [No Extremity Clubbing/Cyanosis] : no extremity clubbing/cyanosis [No Palpable Aorta] : no palpable aorta [Soft] : abdomen soft [Non Tender] : non-tender [Non-distended] : non-distended [No Masses] : no abdominal mass palpated [No HSM] : no HSM [Normal Bowel Sounds] : normal bowel sounds [Normal Posterior Cervical Nodes] : no posterior cervical lymphadenopathy [Normal Anterior Cervical Nodes] : no anterior cervical lymphadenopathy [No CVA Tenderness] : no CVA  tenderness [No Spinal Tenderness] : no spinal tenderness [No Joint Swelling] : no joint swelling [Grossly Normal Strength/Tone] : grossly normal strength/tone [No Rash] : no rash [Normal Gait] : normal gait [Coordination Grossly Intact] : coordination grossly intact [No Focal Deficits] : no focal deficits [Deep Tendon Reflexes (DTR)] : deep tendon reflexes were 2+ and symmetric [Normal Affect] : the affect was normal [Normal Insight/Judgement] : insight and judgment were intact

## 2018-10-31 NOTE — ASSESSMENT
[FreeTextEntry1] : migraines pt is improving and will continue medications  propranolol  and Imitrex and keep diary for triggers. Avoid caffeine,  sulfites , cured meats , get at least 8 hrs a sleep daily hydrate well.     She needs mmr vaccine since she is not immune to mumps but is to have her menes and has migraines associated with it and will hold off until she has completely improved.  she has cpe due in March and will give it then. she had flu vaccine.

## 2018-10-31 NOTE — HISTORY OF PRESENT ILLNESS
[FreeTextEntry1] : headaches.   [de-identified] : pt has seen neurologist and I appreciate his note and has been keeping a dairy.  she has  been placed on medication propranolol and amitriptyline and naratriptan  imetrex 100mg   for acute migraine.  she has  lessened headaches and avoiding triggers.  she completed meloxicam.  We discussed she should take the propranolol  and b2  and the imetrex when needed and amitriptyline she doesn’t want to take and will stop .   she is feeling well.  she is much better. She also saw cardiologist and is to have echo.  breast fu she had repeat breast exam.

## 2018-11-07 ENCOUNTER — OUTPATIENT (OUTPATIENT)
Dept: OUTPATIENT SERVICES | Facility: HOSPITAL | Age: 33
LOS: 1 days | End: 2018-11-07
Payer: COMMERCIAL

## 2018-11-07 DIAGNOSIS — R01.1 CARDIAC MURMUR, UNSPECIFIED: ICD-10-CM

## 2018-11-07 PROCEDURE — 93306 TTE W/DOPPLER COMPLETE: CPT

## 2018-11-07 PROCEDURE — 93306 TTE W/DOPPLER COMPLETE: CPT | Mod: 26

## 2019-02-09 ENCOUNTER — RESULT REVIEW (OUTPATIENT)
Age: 34
End: 2019-02-09

## 2019-03-10 ENCOUNTER — TRANSCRIPTION ENCOUNTER (OUTPATIENT)
Age: 34
End: 2019-03-10

## 2019-03-21 ENCOUNTER — APPOINTMENT (OUTPATIENT)
Dept: INTERNAL MEDICINE | Facility: CLINIC | Age: 34
End: 2019-03-21
Payer: COMMERCIAL

## 2019-03-21 VITALS
TEMPERATURE: 98.4 F | SYSTOLIC BLOOD PRESSURE: 112 MMHG | DIASTOLIC BLOOD PRESSURE: 75 MMHG | BODY MASS INDEX: 23.92 KG/M2 | OXYGEN SATURATION: 99 % | HEIGHT: 62 IN | WEIGHT: 130 LBS | HEART RATE: 70 BPM

## 2019-03-21 PROCEDURE — 90471 IMMUNIZATION ADMIN: CPT

## 2019-03-21 PROCEDURE — 99395 PREV VISIT EST AGE 18-39: CPT | Mod: 25

## 2019-03-21 PROCEDURE — 90707 MMR VACCINE SC: CPT

## 2019-03-21 NOTE — ASSESSMENT
[FreeTextEntry1] : health maintenance  normal bmi  she is up to date with gyn  breast , eye and dental She needs mmr and will give it to her today.  Headaches have resolved.

## 2019-03-21 NOTE — HISTORY OF PRESENT ILLNESS
[FreeTextEntry1] : cpe [de-identified] : Pt is a 34 yr old woman who came for her annual wellness exam.

## 2019-03-21 NOTE — HEALTH RISK ASSESSMENT
[Excellent] : ~his/her~  mood as  excellent [No falls in past year] : Patient reported no falls in the past year [0] : 2) Feeling down, depressed, or hopeless: Not at all (0) [HIV Test offered] : HIV Test offered [Hepatitis C test offered] : Hepatitis C test offered [None] : None [With Family] : lives with family [# of Members in Household ___] :  household currently consist of [unfilled] member(s) [Employed] : employed [College] : College [] :  [# Of Children ___] : has [unfilled] children [Sexually Active] : sexually active [Feels Safe at Home] : Feels safe at home [Fully functional (bathing, dressing, toileting, transferring, walking, feeding)] : Fully functional (bathing, dressing, toileting, transferring, walking, feeding) [Fully functional (using the telephone, shopping, preparing meals, housekeeping, doing laundry, using] : Fully functional and needs no help or supervision to perform IADLs (using the telephone, shopping, preparing meals, housekeeping, doing laundry, using transportation, managing medications and managing finances) [Smoke Detector] : smoke detector [Carbon Monoxide Detector] : carbon monoxide detector [Safety elements used in home] : safety elements used in home [Seat Belt] :  uses seat belt [FreeTextEntry1] : none [] : No [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 [de-identified] : goes to gym 2x a week [de-identified] : healthy [TEA1Mxcos] : 0 [Change in mental status noted] : No change in mental status noted [Language] : denies difficulty with language [Behavior] : denies difficulty with behavior [Learning/Retaining New Information] : denies difficulty learning/retaining new information [Handling Complex Tasks] : denies difficulty handling complex tasks [Reasoning] : denies difficulty with reasoning [Spatial Ability and Orientation] : denies difficulty with spatial ability and orientation [Reports changes in hearing] : Reports no changes in hearing [Reports changes in vision] : Reports no changes in vision [Reports changes in dental health] : Reports no changes in dental health [Guns at Home] : no guns at home [Sunscreen] : does not use sunscreen [Travel to Developing Areas] : does not  travel to developing areas [TB Exposure] : is not being exposed to tuberculosis [Caregiver Concerns] : does not have caregiver concerns [HIVDate] : 3/18 [FreeTextEntry2] :  [de-identified] : last yr exam [de-identified] : last exam 3mths ago [AdvancecareDate] : 03/21/19

## 2019-03-21 NOTE — PHYSICAL EXAM
[Well Developed] : well developed [Well Nourished] : well nourished [Normal Voice Quality] : was normal [Normal Verbal Skills] : the patient had normal verbal communication skills [Normal Nonverbal Skills] : normal nonverbal communication skills were demonstrated [Conjunctiva] : the conjunctiva were normal in both eyes [PERRL] : pupils were equal in size, round, and reactive to light [EOM Intact] : extraocular movements were intact [Normal Outer Ear/Nose] : the outer ears and nose were normal in appearance [Normal Oropharynx] : the oropharynx was normal [Normal TMs] : both tympanic membranes were normal [Normal Nasal Mucosa] : the nasal mucosa was normal [Normal Appearance] : was normal in appearance [Neck Supple] : was supple [Rate ___] : at [unfilled] breaths per minute [Normal Rhythm/Effort] : normal respiratory rhythm and effort [Clear Bilaterally] : the lungs were clear to auscultation bilaterally [Normal to Percussion] : the lungs were normal to percussion [Heart Rate ___] : [unfilled] bpm [Normal Rate] : normal [Normal S1] : normal S1 [Normal S2] : normal S2 [No Pitting Edema] : no pitting edema present [2+] : left 2+ [No Abnormalities] : the abdominal aorta was not enlarged and no bruit was heard [Examination Of The Breasts] : a normal appearance [No Discharge] : no discharge [Soft, Nontender] : the abdomen was soft and nontender [No Mass] : no masses were palpated [No HSM] : no hepatosplenomegaly noted [None] : no CVA tenderness [No Lymphangitis] : no lymphangitis observed [Normal Kyphosis] : normal kyphosis [No Visual Abnormalities] : no visible abnormalities [Normal Lordosis] : normal lordosis [No Scoliosis] : no scoliosis [No Tenderness to Palpation] : no spine tenderness on palpation [No Masses] : no masses [Full ROM] : full ROM [No Pain with ROM] : no pain with motion in any direction [Intact] : all reflexes within normal limits bilaterally [Normal Station and Gait] : the gait and station were normal [Normal Motor Tone] : the muscle tone was normal [Involuntary Movements] : no involuntary movements were seen [Normal Scalp] : inspection of the scalp showed no abnormalities [Examination Of The Hair] : texture and distribution of hair was normal [Complexion Medium] : medium complexion [Normal] : the deep tendon reflexes were normal [Normal Mental Status] : the patient's orientation, memory, attention, language and fund of knowledge were normal [Appropriate] : appropriate [JVP Elevated ___cm] : the JVP was not elevated [Enlarged Diffusely] : was not enlarged [Hepatojugular Reflux] : no sustained hepatojugular reflux [S3] : no S3 [S4] : no S4 [I] : a grade 1 [Rt] : no varicose veins of the right leg [Lt] : no varicose veins of the left leg [Left Carotid Bruit] : no bruit heard over the left carotid [Right Carotid Bruit] : no bruit heard over the right carotid [Left Femoral Bruit] : no bruit heard over the left femoral artery [Right Femoral Bruit] : no bruit heard over the right femoral artery [Bruit] : no bruit heard [Postauricular Lymph Nodes Enlarged Bilaterally] : nodes not enlarged [Preauricular Lymph Nodes Enlarged Bilaterally] : nodes not enlarged [Suboccipital Lymph Nodes Enlarged Bilaterally] : nodes not enlarged [Submandibular Lymph Nodes Enlarged Bilaterally] : nodes not enlarged [Submental Lymph Nodes Enlarged] : nodes not enlarged [Cervical Lymph Nodes Enlarged Posterior Bilaterally] : nodes not enlarged [Supraclavicular Lymph Nodes Enlarged Bilaterally] : nodes not enlarged [Cervical Lymph Nodes Enlarged Anterior Bilaterally] : nodes not enlarged [Axillary Lymph Nodes Enlarged Bilaterally] : nodes not enlarged [Epitrochlear Lymph Nodes Enlarged Bilaterally] : nodes not enlarged [Femoral Lymph Nodes Enlarged Bilaterally] : nodes not enlarged [Inguinal Lymph Nodes Enlarged Bilaterally] : nodes not enlarged [Abnormal Color] : normal color and pigmentation [Skin Lesions 1] : no skin lesions were observed [Skin Turgor Decreased] : normal skin turgor [Impaired judgment] : intact judgment [Impaired Insight] : intact insight [de-identified] : tongue normal front left upper incisor mis positioned.

## 2019-03-21 NOTE — PAST MEDICAL HISTORY
[Menstruating] : menstruating [Menarche Age ____] : age at menarche was [unfilled] [Definite ___ (Date)] : the last menstrual period was [unfilled] [Normal Amount/Duration] : it was of a normal amount and duration [Total Preg ___] : G[unfilled] [Live Births ___] : P[unfilled]  [Full Term ___] : Full Term: [unfilled] [Living ___] : Living: [unfilled] [AB Induced ___] : elective abortions: [unfilled]

## 2019-08-09 ENCOUNTER — FORM ENCOUNTER (OUTPATIENT)
Age: 34
End: 2019-08-09

## 2019-08-10 ENCOUNTER — APPOINTMENT (OUTPATIENT)
Dept: ULTRASOUND IMAGING | Facility: CLINIC | Age: 34
End: 2019-08-10
Payer: COMMERCIAL

## 2019-08-10 ENCOUNTER — APPOINTMENT (OUTPATIENT)
Dept: MAMMOGRAPHY | Facility: CLINIC | Age: 34
End: 2019-08-10
Payer: COMMERCIAL

## 2019-08-10 ENCOUNTER — OUTPATIENT (OUTPATIENT)
Dept: OUTPATIENT SERVICES | Facility: HOSPITAL | Age: 34
LOS: 1 days | End: 2019-08-10
Payer: COMMERCIAL

## 2019-08-10 DIAGNOSIS — Z00.8 ENCOUNTER FOR OTHER GENERAL EXAMINATION: ICD-10-CM

## 2019-08-10 PROCEDURE — G0279: CPT | Mod: 26

## 2019-08-10 PROCEDURE — 77066 DX MAMMO INCL CAD BI: CPT | Mod: 26

## 2019-08-10 PROCEDURE — 77066 DX MAMMO INCL CAD BI: CPT

## 2019-08-10 PROCEDURE — 76641 ULTRASOUND BREAST COMPLETE: CPT | Mod: 26,50

## 2019-08-10 PROCEDURE — G0279: CPT

## 2019-08-10 PROCEDURE — 76641 ULTRASOUND BREAST COMPLETE: CPT

## 2019-09-16 ENCOUNTER — APPOINTMENT (OUTPATIENT)
Dept: BREAST CENTER | Facility: CLINIC | Age: 34
End: 2019-09-16
Payer: COMMERCIAL

## 2019-09-16 VITALS
HEART RATE: 66 BPM | SYSTOLIC BLOOD PRESSURE: 103 MMHG | DIASTOLIC BLOOD PRESSURE: 69 MMHG | BODY MASS INDEX: 24.29 KG/M2 | TEMPERATURE: 98.1 F | WEIGHT: 132 LBS | HEIGHT: 62 IN

## 2019-09-16 PROCEDURE — 99214 OFFICE O/P EST MOD 30 MIN: CPT

## 2019-09-16 RX ORDER — NORGESTIMATE AND ETHINYL ESTRADIOL 7DAYSX3 LO
0.18/0.215/0.25 KIT ORAL
Refills: 0 | Status: ACTIVE | COMMUNITY

## 2019-09-16 RX ORDER — NARATRIPTAN 2.5 MG/1
2.5 TABLET, FILM COATED ORAL
Qty: 14 | Refills: 0 | Status: COMPLETED | COMMUNITY
Start: 2018-10-11 | End: 2019-09-16

## 2019-09-16 RX ORDER — MULTIVITAMIN
TABLET ORAL
Refills: 0 | Status: ACTIVE | COMMUNITY

## 2019-09-16 RX ORDER — VITAMIN K2 90 MCG
125 MCG CAPSULE ORAL
Qty: 1 | Refills: 2 | Status: COMPLETED | COMMUNITY
Start: 2018-03-16 | End: 2019-09-16

## 2019-09-16 RX ORDER — SUMATRIPTAN 100 MG/1
100 TABLET, FILM COATED ORAL
Qty: 12 | Refills: 3 | Status: COMPLETED | COMMUNITY
Start: 2018-10-11 | End: 2019-09-16

## 2019-09-16 RX ORDER — LEVONORGESTREL AND ETHINYL ESTRADIOL 0.1-0.02MG
KIT ORAL
Refills: 0 | Status: COMPLETED | COMMUNITY
End: 2019-09-16

## 2019-09-16 RX ORDER — MELOXICAM 7.5 MG/1
7.5 TABLET ORAL DAILY
Qty: 10 | Refills: 0 | Status: COMPLETED | COMMUNITY
Start: 2018-10-11 | End: 2019-09-16

## 2019-09-16 RX ORDER — AMITRIPTYLINE HYDROCHLORIDE 10 MG/1
10 TABLET, FILM COATED ORAL
Qty: 30 | Refills: 3 | Status: COMPLETED | COMMUNITY
Start: 2018-10-11 | End: 2019-09-16

## 2019-09-16 RX ORDER — THIAMINE HCL 100 MG
100 TABLET ORAL
Qty: 120 | Refills: 3 | Status: COMPLETED | COMMUNITY
Start: 2018-10-11 | End: 2019-09-16

## 2019-09-16 RX ORDER — PROPRANOLOL HYDROCHLORIDE 80 MG/1
80 CAPSULE, EXTENDED RELEASE ORAL DAILY
Qty: 30 | Refills: 3 | Status: COMPLETED | COMMUNITY
Start: 2018-10-11 | End: 2019-09-16

## 2019-09-16 NOTE — PHYSICAL EXAM
[Normocephalic] : normocephalic [Supple] : supple [Atraumatic] : atraumatic [Examined in the supine and seated position] : examined in the supine and seated position [No Supraclavicular Adenopathy] : no supraclavicular adenopathy [No dominant masses] : no dominant masses in right breast  [No dominant masses] : no dominant masses left breast [No Nipple Retraction] : no left nipple retraction [No Nipple Discharge] : no left nipple discharge [No Axillary Lymphadenopathy] : no left axillary lymphadenopathy [No Edema] : no edema [No Rashes] : no rashes [No Ulceration] : no ulceration [Bra Size: ___] : Bra Size: [unfilled] [No Swelling] : no swelling [Full ROM] : full range of motion [de-identified] : Pt's breasts are lumpy bumpy.  No discrete masses and area referred to pt correspond with the areas noted by u/s and bx PASH/FA changes in the past.  [de-identified] : UIQ scar [de-identified] : LIQ scar

## 2019-09-16 NOTE — ASSESSMENT
[FreeTextEntry1] :  33 y/o female referred for follow up after recent studies.  ? of left UIQ lumpiness, which increases in size with her menses. Had biopsies to these areas and found to be PASH and FA changes in  bx. Also area stable when compared to prior studies as below.\par \par 8/10/19 [facility] D[facility]/US; baseline, dense, jordan markers noted, L inner circumscribed masses corresp to US findings, no susp findings reported.BR3\par 8/10/19 [facility] US: compared to 3/29/18, 10/27/18, \par                            R 12:00 N1 ([id number]) stable hypoechoic mass since ;                                                                      L 11:00 N3 ([id number])palp stable hypoechoic mass since                                                                   10:00 N4-5 ([id number]) palp stable hypoechoic mass since                                                                10:00 N4-5 (5Q3Y0fn) and (3Y4V1nl) stable hypoechoic mass since . BR3 F/U US rec 1 yr.   \par 10/27/18 [facility] US 10:00 N4-5 (5J5W9tf) unchanged hypoechoic nodule\par                               10:00 N4-5 more medially (1P4L8xk) unchanged hypoechoic nodule\par                               10:00 N4-5 (1.9X1.8X1cm) unchanged circumscribed ovoid nodule BR3\par \par 3/29/18 [facility]: Bilat u/s: Right 12:00 1N, 1.2x9 cm (was 1.3x1.2 cm on prior), 3:00 5N and 9:00 2N-no sonographic abnl, 5:00 6N postop changes, Left 7:00 4N negative, 10:00 4N 1.9x1.8 cm and adjacent 6x6 mm and 5x5 mm new nodules, 11:00 3N , 1.7x2.2 cm nodule with bx marker-may correlate with prior 2.4x1.9 cm nodule, BR3. 6 mos f.u u/s rec. but prior images requested for comparison given some of the new nodules on left is not mentioned in the prior report.  Pt to drop this off to radiology later this week. \par \par Hx of u/s bilat core biopsies 3/2/15 L 10-11:00 3N (2.4x1.9 cm)-PASH and R 12:00 1N (1.3x1.2cm)-fibrosis and fibroadenomatoid changes. Hx of bilat exc biopsies at 15 y/o, FA.  \par MAunt with breast cancer at 36, -. Mother lives in [place].\par CBE: Well healed scars in Right LIQ and Left UIQ, no discrete masses in area of former bx of PASH/FA changes at this time.  No adenopathy axilla SC.\par BIlat u/s due  for BR 3, f.u with NP after.\par Mother also here for exam and genetic testing given FHx.

## 2019-09-16 NOTE — PAST MEDICAL HISTORY
[Definite ___ (Date)] : the last menstrual period was [unfilled] [Menarche Age ____] : age at menarche was [unfilled] [Total Preg ___] : G[unfilled] [Abortions ___] : Abortions:[unfilled] [Living ___] : Living: [unfilled] [Age At Live Birth ___] : Age at live birth: [unfilled] [FreeTextEntry6] : None [FreeTextEntry7] : 5 years [FreeTextEntry8] : None

## 2019-09-16 NOTE — DATA REVIEWED
[FreeTextEntry1] : 10/27/18 [facility] US 10:00 N4-5 (4Y5O7af) unchanged hypoechoic nodule\par                               10:00 N4-5 more medially (5U9Q1um) unchanged hypoechoic nodule\par                               10:00 N4-5 (1.9X1.8X1cm) unchanged circumscribed ovoid nodule BR3\par \par 8/10/19 [facility]/US; baseline, dense, jordan markers noted, L inner circumscribed masses corresp to US findings, no susp findings reported.BR3\par 8/10/19 [facility] US: compared to 3/29/18, 10/27/18, \par                            R 12:00 N1 ([id number]) stable hypoechoic mass since 2015;                                                                      L 11:00 N3 ([id number])palp stable hypoechoic mass since 2015                                                                  10:00 N4-5 (05W9H83am) palp stable hypoechoic mass since 2018                                                               10:00 N4-5 (6W7B1db) and (1N3F6au) stable hypoechoic mass since 2018. BR3 F/U US rec 1 yr.  Shanti.

## 2019-09-16 NOTE — HISTORY OF PRESENT ILLNESS
[FreeTextEntry1] : 35 y/o female referred for follow up after recent studies.  ? of left UIQ lumpiness, which increases in size with her menses. Had biopsies to these areas and found to be PASH and FA changes in  bx. Also area stable when compared to prior studies as below.\par \par 8/10/19 [facility]/US; baseline, dense, jordan markers noted, L inner circumscribed masses corresp to US findings, no susp findings reported.BR3\par 8/10/19 [facility] US: compared to 3/29/18, 10/27/18, \par                            R 12:00 N1 ([id number]) stable hypoechoic mass since ;                                                                      L 11:00 N3 ([id number])palp stable hypoechoic mass since                                                                   10:00 N4-5 (18M8D50nh) palp stable hypoechoic mass since                                                                10:00 N4-5 (5O2Q1qm) and (6H0W8dt) stable hypoechoic mass since . BR3 F/U US rec 1 yr.   \par 10/27/18 [facility] US 10:00 N4-5 (5F2G3am) unchanged hypoechoic nodule\par                               10:00 N4-5 more medially (7Q1M1ve) unchanged hypoechoic nodule\par                               10:00 N4-5 (1.9X1.8X1cm) unchanged circumscribed ovoid nodule BR3\par \par 3/29/18 [facility]: Bilat u/s: Right 12:00 1N, 1.2x9 cm (was 1.3x1.2 cm on prior), 3:00 5N and 9:00 2N-no sonographic abnl, 5:00 6N postop changes, Left 7:00 4N negative, 10:00 4N 1.9x1.8 cm and adjacent 6x6 mm and 5x5 mm new nodules, 11:00 3N , 1.7x2.2 cm nodule with bx marker-may correlate with prior 2.4x1.9 cm nodule, BR3. 6 mos f.u u/s rec. but prior images requested for comparison given some of the new nodules on left is not mentioned in the prior report.  Pt to drop this off to radiology later this week. \par \par Hx of u/s bilat core biopsies 3/2/15 L 10-11:00 3N (2.4x1.9 cm)-PASH and R 12:00 1N (1.3x1.2cm)-fibrosis and fibroadenomatoid changes. Hx of bilat exc biopsies at 17 y/o, FA.  \par Adonis with breast cancer at 36, -. Mother lives in [place].

## 2019-09-25 ENCOUNTER — LABORATORY RESULT (OUTPATIENT)
Age: 34
End: 2019-09-25

## 2019-09-25 ENCOUNTER — APPOINTMENT (OUTPATIENT)
Dept: INTERNAL MEDICINE | Facility: CLINIC | Age: 34
End: 2019-09-25
Payer: COMMERCIAL

## 2019-09-25 VITALS
DIASTOLIC BLOOD PRESSURE: 67 MMHG | BODY MASS INDEX: 24.11 KG/M2 | HEIGHT: 62 IN | WEIGHT: 131 LBS | SYSTOLIC BLOOD PRESSURE: 99 MMHG | HEART RATE: 86 BPM | TEMPERATURE: 98.3 F | OXYGEN SATURATION: 99 %

## 2019-09-25 PROCEDURE — 99214 OFFICE O/P EST MOD 30 MIN: CPT

## 2019-09-25 RX ORDER — KETOTIFEN FUMARATE 0.35 MG/ML
0.03 SOLUTION/ DROPS OPHTHALMIC
Qty: 1 | Refills: 1 | Status: ACTIVE | COMMUNITY
Start: 2019-09-25 | End: 1900-01-01

## 2019-09-25 NOTE — HEALTH RISK ASSESSMENT
[No] : No [] : No [de-identified] : exercises 4 times a week.   [de-identified] : heatlhy diet.

## 2019-09-25 NOTE — REVIEW OF SYSTEMS
[Nasal Discharge] : nasal discharge [Sore Throat] : sore throat [Cough] : cough [Negative] : Heme/Lymph [Hoarseness] : no hoarseness

## 2019-09-25 NOTE — HISTORY OF PRESENT ILLNESS
[FreeTextEntry1] : not feeling well.   [de-identified] : Pt is feeling sick , sore throat, change of taste and cough and congestion.  This started yesterday. No fever or chills but has mild body aches and took otc medication.  Robitussin DM and Tylenol.   She is having watery itching eyes.  This tends to occur in spring and fall.  She states her son has allergies.  her cough is productive of yellow mucus.

## 2019-09-25 NOTE — ASSESSMENT
[FreeTextEntry1] : rhinitis pt could have allergies and will test her for them  She will start netipot for her congestion and nasal inhaler for her rhinitis.  She has allergic conjunctivitis and will use eye drops and cough mucinex .  She will call for fu appt if needed.  We discussed if she has allergies allergy proofing her home.  Once a person's trigger(s) have been identified, the next step is to reduce exposure to those specific allergens. Triggers may be present at work or at home, although for most people, the home environment is the primary source. It is especially important to reduce exposure to triggers in the bedroom because most people spend a significant number of hours there. However, to be effective, changes must be made throughout the entire home Dust mites — Dust mites are a microscopic type of insect that live in bedding, sofas, carpets, or any woven material. Dust mites do not bite and do not cause harm to humans, other than by triggering allergies.\par Mites absorb humidity from the atmosphere (ie, they do not drink) and feed on organic matter (including shed human and animal skin). They require sufficient humidity and nests to live in (which are not visible with the naked eye). Dust mite infestation is less common in dry climates, such as the [place].\par Exposure to dust mites can be reduced by encasing pillows, mattresses, box springs, comforters, and furniture in mite-impermeable barriers. When covering crib mattresses and children's mattresses and pillows, only tight-fitting, commercial covers intended for this purpose should be used. Homemade covers (for example, plastic sheeting fastened with duct tape) should not be used in children's beds, as these can come apart, and children can become trapped or suffocate. Tightly-woven fabrics with a pore size of 6 microns or less are very effective at controlling the passage of mite as well as cat allergens. Fabrics with a pore size greater than 2 microns still permit airflow Mites can be eliminated by washing sheets and blankets weekly in warm water with detergent or by drying them in an electric dryer on the hot setting Exposure can be further reduced by vacuuming with a vacuum  equipped with a high-efficiency particulate air (HEPA) filter, dusting regularly, and not sleeping on upholstered furniture (eg, couches). However, studies have yet to show that physical or chemical cleaning methods reduce mite levels to a degree that improves symptoms Indoor humidity levels should be kept between 30 and 50 percent. Inexpensive humidity monitors can be purchased at most hardware stores. Humidifiers make the problem worse and are not recommended.\par When possible, the amount of clutter, carpet, upholstered furniture, and drapes should be minimized, and horizontal blinds should be eliminated in the rooms where the person spends the most time (bedroom, study, television room). Washable vinyl, roller-type shades are optimal. For children, the number of stuffed toys in the bedroom should be minimized.\par Animal dander — Animal dander is made up of the dead skin cells or scales (like dandruff) that are constantly shed by animals. Any breed of dog or cat is capable of being allergenic, although the levels given off by individual animals may vary to some degree. In cats, the protein that causes most people's allergies is found in the cat's saliva, skin glands, and urinary/reproductive tract. Accordingly, short-haired cats are not necessarily less allergenic than long-haired animals, and furless cats have allergen levels similar to furred cats.\par Other animals, such as rodents, birds, and ferrets, can also trigger symptoms in an allergic individual. Pets without feathers or fur, such as reptiles, turtles, and fish, rarely cause allergy, although deposits of fish food that build up under the covers of fish tanks are an excellent source of food for dust mite colonies.\par If a person is found to be allergic to a pet, the most effective option is to remove the pet from the home. Limiting an animal to a certain area in the house is not effective, because allergens are carried on clothing or spread in the air. Once a pet has left a home, careful cleaning (or removal) of carpets, sofas, curtains, and bedding must follow. This is particularly true for cat allergens because they are "sticky" and adhere to a variety of indoor surfaces. Even after a cat has been removed from a home and it has been thoroughly cleaned, it can take months for the level of cat allergen to drop. For this reason, it may take months for the person's symptoms to fully reflect the absence of the pet.\par If it is not possible to remove the animal, measures can be taken to decrease exposure to the animal dander (although none of these methods are as effective as removing the animal. Vacuum  with a HEPA filter are effective in reducing cat and dog allergen levels in the home and can reduce symptoms Rodents — Mice and rats have proteins in their urine that can cause allergies. This applies to rodents that live in a laboratory setting, as well as rodents that live in the wild.\par To reduce rodent allergen levels significantly, a combination of pest control methods, in addition to pesticides (eg, poison baits), are usually necessary. This includes keeping food and trash in covered containers, cleaning food scraps from the floor and countertops, and sealing cracks in the walls, doors, and floors.\par Cockroaches — Cockroach droppings contain allergens that can trigger asthma and allergic rhinitis in sensitive individuals. Cockroaches thrive in warm, moist environments with easily accessible food and water. Unfortunately, efforts to control cockroach populations in infested areas are often less than successful. Still, certain measures are recommended:\par ?Use multiple baited traps or poisons\par ?Remove garbage and food waste promptly from the home\par ?Wash dishes and cooking utensils immediately after use\par ?Remove cockroach debris quickly\par ?Eliminate any standing water from leaking faucets or drains\par ?Keep humidity levels less than 50 percent with a dehumidifier or air conditioner\par ?Consult a professional  for large or recurrent infestations\par  ladybugs — Asian ladybugs were previously imported to the [place] as a biologic means of controlling aphids. It was anticipated that the insects would not survive the cold of winter. However, they adapted by moving inside houses when temperatures drop in the early fall. Allergies to Asian ladybugs have been increasingly reported as a source of seasonal indoor respiratory symptoms, particularly chronic cough, rhinitis, and asthma. Most cases have been reported in rural areas of the central, midwestern, and southern [place]. The insects can also bite and cause local reactions.\par  ladybugs enter homes through external cracks and crevices and then infest spaces within walls. They secrete a brown liquid that may stain walls and produce an unpleasant smell.\par Treating the exterior of the house with a chemical (pyrethroids) before cold weather arrives can prevent swarming of the ladybugs inside the home. Pyrethroids are similar to pyrethrins, which are derived from marigold flowers. Pyrethrins are broken down by the sun and do not significantly affect groundwater quality.\par Indoor molds — Mold spores can trigger symptoms of allergic rhinitis in allergic patients. Mold thrives in damp environments. Areas, such as air conditioning vents, water traps, refrigerator drip trays, shower stalls, leaky sinks, and damp basements, are particularly vulnerable to mold growth if not cleaned regularly. Most of the mold spores enter the home from the outside air. However, under certain circumstances\par  Sh e also could have a mild cold  and will rest and drink plenty of lfuids.

## 2019-09-25 NOTE — PHYSICAL EXAM
[Conjunctiva] : the conjunctiva were normal in both eyes [Conjunctival Hyperemia Bilateral] : hyperemia [PERRL] : pupils were equal in size, round, and reactive to light [EOM Intact] : extraocular movements were intact [Normal Outer Ear/Nose] : the outer ears and nose were normal in appearance [Normal Oropharynx] : the oropharynx was normal [Normal TMs] : both tympanic membranes were normal [Rate ___] : at [unfilled] breaths per minute [Normal Rhythm/Effort] : normal respiratory rhythm and effort [Clear Bilaterally] : the lungs were clear to auscultation bilaterally [Normal to Percussion] : the lungs were normal to percussion [Normal Rate] : normal rate  [Regular Rhythm] : with a regular rhythm [No Edema] : there was no peripheral edema [No Extremity Clubbing/Cyanosis] : no extremity clubbing/cyanosis [Normal Posterior Cervical Nodes] : no posterior cervical lymphadenopathy [Normal Anterior Cervical Nodes] : no anterior cervical lymphadenopathy [No Spinal Tenderness] : no spinal tenderness [No CVA Tenderness] : no CVA  tenderness [Normal Gait] : normal gait [Normal] : affect was normal and insight and judgment were intact [de-identified] : erythema and edema of nasal mucosa bilateral

## 2019-09-27 LAB
BASOPHILS # BLD AUTO: 0.03 K/UL
BASOPHILS NFR BLD AUTO: 0.3 %
EOSINOPHIL # BLD AUTO: 0.27 K/UL
EOSINOPHIL NFR BLD AUTO: 2.7 %
HCT VFR BLD CALC: 35.8 %
HGB BLD-MCNC: 11.4 G/DL
IMM GRANULOCYTES NFR BLD AUTO: 0.3 %
LYMPHOCYTES # BLD AUTO: 3.12 K/UL
LYMPHOCYTES NFR BLD AUTO: 30.7 %
MAN DIFF?: NORMAL
MCHC RBC-ENTMCNC: 28.6 PG
MCHC RBC-ENTMCNC: 31.8 GM/DL
MCV RBC AUTO: 89.7 FL
MONOCYTES # BLD AUTO: 0.53 K/UL
MONOCYTES NFR BLD AUTO: 5.2 %
NEUTROPHILS # BLD AUTO: 6.19 K/UL
NEUTROPHILS NFR BLD AUTO: 60.8 %
PLATELET # BLD AUTO: 314 K/UL
RBC # BLD: 3.99 M/UL
RBC # FLD: 13.1 %
WBC # FLD AUTO: 10.17 K/UL

## 2019-10-01 LAB
A ALTERNATA IGE QN: <0.1 KUA/L
A FUMIGATUS IGE QN: <0.1 KUA/L
BERMUDA GRASS IGE QN: <0.1 KUA/L
BOXELDER IGE QN: <0.1 KUA/L
C HERBARUM IGE QN: <0.1 KUA/L
CALIF WALNUT IGE QN: <0.1 KUA/L
CAT DANDER IGE QN: <0.1 KUA/L
CMN PIGWEED IGE QN: <0.1 KUA/L
COMMON RAGWEED IGE QN: <0.1 KUA/L
COTTONWOOD IGE QN: <0.1 KUA/L
D FARINAE IGE QN: <0.1 KUA/L
D PTERONYSS IGE QN: <0.1 KUA/L
DEPRECATED A ALTERNATA IGE RAST QL: 0
DEPRECATED A FUMIGATUS IGE RAST QL: 0
DEPRECATED BERMUDA GRASS IGE RAST QL: 0
DEPRECATED BOXELDER IGE RAST QL: 0
DEPRECATED C HERBARUM IGE RAST QL: 0
DEPRECATED CAT DANDER IGE RAST QL: 0
DEPRECATED COMMON PIGWEED IGE RAST QL: 0
DEPRECATED COMMON RAGWEED IGE RAST QL: 0
DEPRECATED COTTONWOOD IGE RAST QL: 0
DEPRECATED D FARINAE IGE RAST QL: 0
DEPRECATED D PTERONYSS IGE RAST QL: 0
DEPRECATED DOG DANDER IGE RAST QL: 0
DEPRECATED GOOSEFOOT IGE RAST QL: 0
DEPRECATED LONDON PLANE IGE RAST QL: 0
DEPRECATED MUGWORT IGE RAST QL: 0
DEPRECATED P NOTATUM IGE RAST QL: 0
DEPRECATED RED CEDAR IGE RAST QL: 0
DEPRECATED ROACH IGE RAST QL: 0
DEPRECATED SHEEP SORREL IGE RAST QL: 0
DEPRECATED SILVER BIRCH IGE RAST QL: 0
DEPRECATED TIMOTHY IGE RAST QL: 0
DEPRECATED WHITE ASH IGE RAST QL: 0
DEPRECATED WHITE OAK IGE RAST QL: 0
DOG DANDER IGE QN: <0.1 KUA/L
GOOSEFOOT IGE QN: <0.1 KUA/L
LONDON PLANE IGE QN: <0.1 KUA/L
MUGWORT IGE QN: <0.1 KUA/L
MULBERRY (T70) CLASS: 0
MULBERRY (T70) CONC: <0.1 KUA/L
P NOTATUM IGE QN: <0.1 KUA/L
RED CEDAR IGE QN: <0.1 KUA/L
ROACH IGE QN: <0.1 KUA/L
SHEEP SORREL IGE QN: <0.1 KUA/L
SILVER BIRCH IGE QN: <0.1 KUA/L
TIMOTHY IGE QN: <0.1 KUA/L
TREE ALLERG MIX1 IGE QL: 0
WHITE ASH IGE QN: <0.1 KUA/L
WHITE ELM IGE QN: 0
WHITE ELM IGE QN: <0.1 KUA/L
WHITE OAK IGE QN: <0.1 KUA/L

## 2020-03-02 ENCOUNTER — LABORATORY RESULT (OUTPATIENT)
Age: 35
End: 2020-03-02

## 2020-03-02 ENCOUNTER — APPOINTMENT (OUTPATIENT)
Dept: INTERNAL MEDICINE | Facility: CLINIC | Age: 35
End: 2020-03-02
Payer: COMMERCIAL

## 2020-03-02 VITALS
TEMPERATURE: 98.2 F | HEART RATE: 70 BPM | SYSTOLIC BLOOD PRESSURE: 102 MMHG | WEIGHT: 133.6 LBS | DIASTOLIC BLOOD PRESSURE: 65 MMHG | BODY MASS INDEX: 24.59 KG/M2 | OXYGEN SATURATION: 99 % | HEIGHT: 62 IN

## 2020-03-02 DIAGNOSIS — Z87.09 PERSONAL HISTORY OF OTHER DISEASES OF THE RESPIRATORY SYSTEM: ICD-10-CM

## 2020-03-02 DIAGNOSIS — G44.001 CLUSTER HEADACHE SYNDROME, UNSPECIFIED, INTRACTABLE: ICD-10-CM

## 2020-03-02 DIAGNOSIS — G43.839 MENSTRUAL MIGRAINE, INTRACTABLE, W/OUT STATUS MIGRAINOSUS: ICD-10-CM

## 2020-03-02 DIAGNOSIS — G43.019 MIGRAINE W/OUT AURA, INTRACTABLE, W/OUT STATUS MIGRAINOSUS: ICD-10-CM

## 2020-03-02 DIAGNOSIS — R51 HEADACHE: ICD-10-CM

## 2020-03-02 PROCEDURE — 99395 PREV VISIT EST AGE 18-39: CPT

## 2020-03-02 RX ORDER — GUAIFENESIN AND DEXTROMETHORPHAN HYDROBROMIDE 600; 30 MG/1; MG/1
30-600 TABLET, EXTENDED RELEASE ORAL
Qty: 10 | Refills: 0 | Status: COMPLETED | COMMUNITY
Start: 2019-09-25 | End: 2020-03-02

## 2020-03-02 RX ORDER — SODIUM BICARBONATE, SODIUM CHLORIDE 700; 2300 MG/3G; MG/3G
2300-700 POWDER, FOR SOLUTION NASAL
Qty: 1 | Refills: 0 | Status: COMPLETED | COMMUNITY
Start: 2019-09-25 | End: 2020-03-02

## 2020-03-02 NOTE — COUNSELING
[Sleep ___ hours/day] : Sleep [unfilled] hours/day [Plan in advance] : Plan in advance [Engage in a relaxing activity] : Engage in a relaxing activity [Good understanding] : Patient has a good understanding of lifestyle changes and steps needed to achieve self management goal [None] : None

## 2020-03-02 NOTE — HEALTH RISK ASSESSMENT
[Good] : ~his/her~  mood as  good [Yes] : Yes [2 - 4 times a month (2 pts)] : 2-4 times a month (2 points) [1 or 2 (0 pts)] : 1 or 2 (0 points) [No falls in past year] : Patient reported no falls in the past year [Never (0 pts)] : Never (0 points) [0] : 2) Feeling down, depressed, or hopeless: Not at all (0) [HIV Test offered] : HIV Test offered [Hepatitis C test offered] : Hepatitis C test offered [None] : None [With Family] : lives with family [# of Members in Household ___] :  household currently consist of [unfilled] member(s) [College] : College [Employed] : employed [] :  [# Of Children ___] : has [unfilled] children [Sexually Active] : sexually active [Feels Safe at Home] : Feels safe at home [Fully functional (bathing, dressing, toileting, transferring, walking, feeding)] : Fully functional (bathing, dressing, toileting, transferring, walking, feeding) [Fully functional (using the telephone, shopping, preparing meals, housekeeping, doing laundry, using] : Fully functional and needs no help or supervision to perform IADLs (using the telephone, shopping, preparing meals, housekeeping, doing laundry, using transportation, managing medications and managing finances) [Carbon Monoxide Detector] : carbon monoxide detector [Smoke Detector] : smoke detector [Guns at Home] : guns at home [Seat Belt] :  uses seat belt [Safety elements used in home] : safety elements used in home [Sunscreen] : uses sunscreen [] : No [FreeTextEntry1] : none  [Audit-CScore] : 2 [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  [de-identified] : goes to gym 4 xs a week [GEE2Pvavs] : 0 [de-identified] : healthy  [Change in mental status noted] : No change in mental status noted [Behavior] : denies difficulty with behavior [Language] : denies difficulty with language [Learning/Retaining New Information] : denies difficulty learning/retaining new information [Handling Complex Tasks] : denies difficulty handling complex tasks [Reasoning] : denies difficulty with reasoning [Spatial Ability and Orientation] : denies difficulty with spatial ability and orientation [Reports changes in hearing] : Reports no changes in hearing [Reports changes in vision] : Reports no changes in vision [Travel to Developing Areas] : does not  travel to developing areas [Reports changes in dental health] : Reports no changes in dental health [Caregiver Concerns] : does not have caregiver concerns [TB Exposure] : is not being exposed to tuberculosis [de-identified] : last eye exam 4/19 [FreeTextEntry2] :   [HIVDate] : 2018 [de-identified] : locked pts  is a  [de-identified] : last dental 1 yr  [AdvancecareDate] : 03/20

## 2020-03-02 NOTE — PAST MEDICAL HISTORY
[Menstruating] : menstruating [Definite ___ (Date)] : the last menstrual period was [unfilled] [Menarche Age ____] : age at menarche was [unfilled] [Total Preg ___] : G[unfilled] [Live Births ___] : P[unfilled]  [Full Term ___] : Full Term: [unfilled] [Living ___] : Living: [unfilled] [AB Induced ___] : elective abortions: [unfilled]

## 2020-03-02 NOTE — PHYSICAL EXAM
[Well Developed] : well developed [Well Nourished] : well nourished [Normal Voice Quality] : was normal [Normal Verbal Skills] : the patient had normal verbal communication skills [Normal Nonverbal Skills] : normal nonverbal communication skills were demonstrated [Conjunctiva] : the conjunctiva were normal in both eyes [PERRL] : pupils were equal in size, round, and reactive to light [EOM Intact] : extraocular movements were intact [Neck Supple] : was supple [Normal Appearance] : was normal in appearance [Rate ___] : at [unfilled] breaths per minute [Clear Bilaterally] : the lungs were clear to auscultation bilaterally [Normal Rhythm/Effort] : normal respiratory rhythm and effort [Normal to Percussion] : the lungs were normal to percussion [Heart Rate ___] : [unfilled] bpm [Normal Rate] : normal [5th Left ICS - MCL] : palpated at the 5th LICS in the midclavicular line [Rhythm Regular] : regular [Normal S1] : normal S1 [Normal S2] : normal S2 [No Murmur] : no murmurs heard [No Pitting Edema] : no pitting edema present [2+] : left 2+ [No Abnormalities] : the abdominal aorta was not enlarged and no bruit was heard [Examination Of The Breasts] : a normal appearance [No Discharge] : no discharge [Soft, Nontender] : the abdomen was soft and nontender [No Mass] : no masses were palpated [No HSM] : no hepatosplenomegaly noted [None] : no hernias were palpable [No Lymphangitis] : no lymphangitis observed [Normal Kyphosis] : normal kyphosis [No Scoliosis] : no scoliosis [Normal Lordosis] : normal lordosis [No Visual Abnormalities] : no visible abnormalities [No Tenderness to Palpation] : no spine tenderness on palpation [No Masses] : no masses [Full ROM] : full ROM [No Pain with ROM] : no pain with motion in any direction [Intact] : all sensory within normal limits bilaterally [Normal Station and Gait] : the gait and station were normal [Normal Scalp] : inspection of the scalp showed no abnormalities [Involuntary Movements] : no involuntary movements were seen [Normal Motor Tone] : the muscle tone was normal [Examination Of The Hair] : texture and distribution of hair was normal [Complexion Medium] : medium complexion [Both Nipples Pierced For Ornamentation] : both nipples [Multiple Tattoos] : multiple tattoos observed [Both Ears Pierced] : both ears [Normal Mental Status] : the patient's orientation, memory, attention, language and fund of knowledge were normal [Normal] : the deep tendon reflexes were normal [Appropriate] : appropriate [JVP Elevated ___cm] : the JVP was not elevated [Enlarged Diffusely] : was not enlarged [S4] : no S4 [Rt] : no varicose veins of the right leg [S3] : no S3 [Right Carotid Bruit] : no bruit heard over the right carotid [Lt] : no varicose veins of the left leg [Left Carotid Bruit] : no bruit heard over the left carotid [Right Femoral Bruit] : no bruit heard over the right femoral artery [Left Femoral Bruit] : no bruit heard over the left femoral artery [Bruit] : no bruit heard [Preauricular Lymph Nodes Enlarged Bilaterally] : nodes not enlarged [Postauricular Lymph Nodes Enlarged Bilaterally] : nodes not enlarged [Submandibular Lymph Nodes Enlarged Bilaterally] : nodes not enlarged [Cervical Lymph Nodes Enlarged Anterior Bilaterally] : nodes not enlarged [Suboccipital Lymph Nodes Enlarged Bilaterally] : nodes not enlarged [Submental Lymph Nodes Enlarged] : nodes not enlarged [Cervical Lymph Nodes Enlarged Posterior Bilaterally] : nodes not enlarged [Supraclavicular Lymph Nodes Enlarged Bilaterally] : nodes not enlarged [Epitrochlear Lymph Nodes Enlarged Bilaterally] : nodes not enlarged [Axillary Lymph Nodes Enlarged Bilaterally] : nodes not enlarged [Abnormal Color] : normal color and pigmentation [Femoral Lymph Nodes Enlarged Bilaterally] : nodes not enlarged [Inguinal Lymph Nodes Enlarged Bilaterally] : nodes not enlarged [Skin Turgor Decreased] : normal skin turgor [Skin Lesions 1] : no skin lesions were observed [Impaired judgment] : intact judgment [Impaired Insight] : intact insight [de-identified] : tongue normal  teeth in good repair

## 2020-03-02 NOTE — ASSESSMENT
[FreeTextEntry1] : health- she is up to date with  gyn, needs dental and up to date with eye . she will have fu for her breast nodules with Dr Rosales . she needs blood testing today. She refused flu vaccine and is up to date with her other vaccines.  Anemia check for her iron and ferritin.  vitd insuff fu levels   Weight loss, exercise, and diet control were discussed and are highly encouraged. Treatment options were given such as, aqua therapy, and contacting a nutritionist. Recommended to use the elliptical, stationary bike, less use of treadmill. Mindful eating was explained to the patient Obesity is associated with worsening asthma, shortness of breath, and potential for cardiac disease, diabetes, and other underlying medical conditions.\par \par ideal weight 119-136 she is 134  \par

## 2020-03-03 LAB
25(OH)D3 SERPL-MCNC: 23.4 NG/ML
ALBUMIN SERPL ELPH-MCNC: 3.8 G/DL
ALP BLD-CCNC: 66 U/L
ALT SERPL-CCNC: 11 U/L
ANION GAP SERPL CALC-SCNC: 14 MMOL/L
APPEARANCE: CLEAR
AST SERPL-CCNC: 15 U/L
BASOPHILS # BLD AUTO: 0.02 K/UL
BASOPHILS NFR BLD AUTO: 0.2 %
BILIRUB SERPL-MCNC: 0.3 MG/DL
BILIRUBIN URINE: NEGATIVE
BLOOD URINE: ABNORMAL
BUN SERPL-MCNC: 10 MG/DL
CALCIUM SERPL-MCNC: 9.2 MG/DL
CHLORIDE SERPL-SCNC: 100 MMOL/L
CHOLEST SERPL-MCNC: 180 MG/DL
CHOLEST/HDLC SERPL: 2.7 RATIO
CO2 SERPL-SCNC: 22 MMOL/L
COLOR: NORMAL
CREAT SERPL-MCNC: 0.81 MG/DL
EOSINOPHIL # BLD AUTO: 0.04 K/UL
EOSINOPHIL NFR BLD AUTO: 0.5 %
FERRITIN SERPL-MCNC: 22 NG/ML
GLUCOSE QUALITATIVE U: NEGATIVE
GLUCOSE SERPL-MCNC: 91 MG/DL
HCT VFR BLD CALC: 39.1 %
HCV AB SER QL: NONREACTIVE
HCV S/CO RATIO: 0.26 S/CO
HDLC SERPL-MCNC: 66 MG/DL
HGB BLD-MCNC: 12.1 G/DL
IMM GRANULOCYTES NFR BLD AUTO: 0.2 %
IRON SATN MFR SERPL: 18 %
IRON SERPL-MCNC: 72 UG/DL
KETONES URINE: NEGATIVE
LDLC SERPL CALC-MCNC: 91 MG/DL
LEUKOCYTE ESTERASE URINE: NEGATIVE
LYMPHOCYTES # BLD AUTO: 3.21 K/UL
LYMPHOCYTES NFR BLD AUTO: 36.6 %
MAN DIFF?: NORMAL
MCHC RBC-ENTMCNC: 28.3 PG
MCHC RBC-ENTMCNC: 30.9 GM/DL
MCV RBC AUTO: 91.6 FL
MONOCYTES # BLD AUTO: 0.42 K/UL
MONOCYTES NFR BLD AUTO: 4.8 %
NEUTROPHILS # BLD AUTO: 5.06 K/UL
NEUTROPHILS NFR BLD AUTO: 57.7 %
NITRITE URINE: NEGATIVE
PH URINE: 5.5
PLATELET # BLD AUTO: 339 K/UL
POTASSIUM SERPL-SCNC: 4.4 MMOL/L
PROT SERPL-MCNC: 7 G/DL
PROTEIN URINE: NEGATIVE
RBC # BLD: 4.27 M/UL
RBC # FLD: 12.9 %
SODIUM SERPL-SCNC: 136 MMOL/L
SPECIFIC GRAVITY URINE: 1.02
TIBC SERPL-MCNC: 411 UG/DL
TRIGL SERPL-MCNC: 115 MG/DL
UIBC SERPL-MCNC: 339 UG/DL
UROBILINOGEN URINE: NORMAL
WBC # FLD AUTO: 8.77 K/UL

## 2020-05-28 ENCOUNTER — APPOINTMENT (OUTPATIENT)
Dept: ULTRASOUND IMAGING | Facility: CLINIC | Age: 35
End: 2020-05-28

## 2020-10-27 ENCOUNTER — APPOINTMENT (OUTPATIENT)
Dept: INTERNAL MEDICINE | Facility: CLINIC | Age: 35
End: 2020-10-27
Payer: COMMERCIAL

## 2020-10-27 VITALS
HEIGHT: 62 IN | DIASTOLIC BLOOD PRESSURE: 67 MMHG | WEIGHT: 135 LBS | BODY MASS INDEX: 24.84 KG/M2 | HEART RATE: 62 BPM | OXYGEN SATURATION: 99 % | TEMPERATURE: 97.9 F | SYSTOLIC BLOOD PRESSURE: 101 MMHG

## 2020-10-27 DIAGNOSIS — J31.0 CHRONIC RHINITIS: ICD-10-CM

## 2020-10-27 DIAGNOSIS — Z92.29 PERSONAL HISTORY OF OTHER DRUG THERAPY: ICD-10-CM

## 2020-10-27 DIAGNOSIS — H10.13 ACUTE ATOPIC CONJUNCTIVITIS, BILATERAL: ICD-10-CM

## 2020-10-27 PROCEDURE — 99072 ADDL SUPL MATRL&STAF TM PHE: CPT

## 2020-10-27 PROCEDURE — 99214 OFFICE O/P EST MOD 30 MIN: CPT | Mod: 25

## 2020-10-27 NOTE — ASSESSMENT
[FreeTextEntry1] : wrist sprain  pt has full range of movement She works on a computer and suggested wrist rest , dorsiflexion splint and nsaids.    Risks and benefits were discussed and include but not limited to renal damage and GI ulceration and bleeding. They were advised to take with food to limit stomach upset as well as warned to stop the medication if worsening gastric pain or dizziness or other side effects. Also to immediately stop the medication and seek appropriate medical attention if any severe stomach ache, gastritis, black/red vomit, black/red stools or any other medical concern. rest wrist no weight lifting .  I don’t feel its cts at this time since its associated with her exercise and weight lifting.  it can be a tendinitis.  \par 2 breast nodule fu  for  sonogram  and mammogram \par 3 vitd insuff  continue vitd  5000units.

## 2020-10-27 NOTE — PHYSICAL EXAM
[No Acute Distress] : no acute distress [Well Nourished] : well nourished [Well Developed] : well developed [Well-Appearing] : well-appearing [Normal Sclera/Conjunctiva] : normal sclera/conjunctiva [PERRL] : pupils equal round and reactive to light [EOMI] : extraocular movements intact [Normal Outer Ear/Nose] : the outer ears and nose were normal in appearance [Normal Oropharynx] : the oropharynx was normal [No JVD] : no jugular venous distention [No Lymphadenopathy] : no lymphadenopathy [Supple] : supple [Thyroid Normal, No Nodules] : the thyroid was normal and there were no nodules present [No Respiratory Distress] : no respiratory distress  [No Accessory Muscle Use] : no accessory muscle use [Clear to Auscultation] : lungs were clear to auscultation bilaterally [Normal Rate] : normal rate  [Regular Rhythm] : with a regular rhythm [Normal S1, S2] : normal S1 and S2 [No Carotid Bruits] : no carotid bruits [No Abdominal Bruit] : a ~M bruit was not heard ~T in the abdomen [No Varicosities] : no varicosities [Pedal Pulses Present] : the pedal pulses are present [No Edema] : there was no peripheral edema [No Palpable Aorta] : no palpable aorta [No Extremity Clubbing/Cyanosis] : no extremity clubbing/cyanosis [Examination Of The Breasts] : a normal appearance [Normal] : normal [No Discharge] : no discharge [Axillary Lymph Nodes Enlarged Bilaterally] : no enlarged nodes [Soft] : abdomen soft [Non Tender] : non-tender [Non-distended] : non-distended [No Masses] : no abdominal mass palpated [No HSM] : no HSM [Normal Bowel Sounds] : normal bowel sounds [Normal Posterior Cervical Nodes] : no posterior cervical lymphadenopathy [Normal Anterior Cervical Nodes] : no anterior cervical lymphadenopathy [No CVA Tenderness] : no CVA  tenderness [No Spinal Tenderness] : no spinal tenderness [No Joint Swelling] : no joint swelling [Grossly Normal Strength/Tone] : grossly normal strength/tone [No Rash] : no rash [Coordination Grossly Intact] : coordination grossly intact [No Focal Deficits] : no focal deficits [Normal Gait] : normal gait [Normal Affect] : the affect was normal [Normal Insight/Judgement] : insight and judgment were intact [de-identified] : right wrist full range of movement no swelling or erythema or  numbness of fingers.

## 2020-10-27 NOTE — HISTORY OF PRESENT ILLNESS
[FreeTextEntry1] : pain in right wrist  [de-identified] : Pt states after working out she hurt her left wrist when lifting weight and started to hurt a few days later . She has been trying ice and uses a splint but it is two weeks and it has not improved.  She stopped lifting weight.s no swelling or redness.

## 2020-11-12 ENCOUNTER — RESULT REVIEW (OUTPATIENT)
Age: 35
End: 2020-11-12

## 2020-11-12 ENCOUNTER — OUTPATIENT (OUTPATIENT)
Dept: OUTPATIENT SERVICES | Facility: HOSPITAL | Age: 35
LOS: 1 days | End: 2020-11-12
Payer: COMMERCIAL

## 2020-11-12 ENCOUNTER — APPOINTMENT (OUTPATIENT)
Dept: MAMMOGRAPHY | Facility: CLINIC | Age: 35
End: 2020-11-12
Payer: COMMERCIAL

## 2020-11-12 ENCOUNTER — APPOINTMENT (OUTPATIENT)
Dept: ULTRASOUND IMAGING | Facility: CLINIC | Age: 35
End: 2020-11-12
Payer: COMMERCIAL

## 2020-11-12 DIAGNOSIS — N63.0 UNSPECIFIED LUMP IN UNSPECIFIED BREAST: ICD-10-CM

## 2020-11-12 DIAGNOSIS — Z00.8 ENCOUNTER FOR OTHER GENERAL EXAMINATION: ICD-10-CM

## 2020-11-12 PROCEDURE — 76641 ULTRASOUND BREAST COMPLETE: CPT | Mod: 26,50

## 2020-11-12 PROCEDURE — 77063 BREAST TOMOSYNTHESIS BI: CPT

## 2020-11-12 PROCEDURE — 77067 SCR MAMMO BI INCL CAD: CPT | Mod: 26

## 2020-11-12 PROCEDURE — 76641 ULTRASOUND BREAST COMPLETE: CPT

## 2020-11-12 PROCEDURE — 77063 BREAST TOMOSYNTHESIS BI: CPT | Mod: 26

## 2020-11-12 PROCEDURE — 77067 SCR MAMMO BI INCL CAD: CPT

## 2021-02-22 ENCOUNTER — RESULT REVIEW (OUTPATIENT)
Age: 36
End: 2021-02-22

## 2021-05-18 ENCOUNTER — LABORATORY RESULT (OUTPATIENT)
Age: 36
End: 2021-05-18

## 2021-05-18 ENCOUNTER — APPOINTMENT (OUTPATIENT)
Dept: INTERNAL MEDICINE | Facility: CLINIC | Age: 36
End: 2021-05-18
Payer: COMMERCIAL

## 2021-05-18 VITALS
HEIGHT: 62 IN | WEIGHT: 134.2 LBS | OXYGEN SATURATION: 98 % | TEMPERATURE: 98.1 F | DIASTOLIC BLOOD PRESSURE: 62 MMHG | HEART RATE: 63 BPM | SYSTOLIC BLOOD PRESSURE: 95 MMHG | BODY MASS INDEX: 24.69 KG/M2

## 2021-05-18 DIAGNOSIS — S63.501A UNSPECIFIED SPRAIN OF RIGHT WRIST, INITIAL ENCOUNTER: ICD-10-CM

## 2021-05-18 DIAGNOSIS — Z71.2 PERSON CONSULTING FOR EXPLANATION OF EXAMINATION OR TEST FINDINGS: ICD-10-CM

## 2021-05-18 DIAGNOSIS — J30.9 ALLERGIC RHINITIS, UNSPECIFIED: ICD-10-CM

## 2021-05-18 PROCEDURE — 99072 ADDL SUPL MATRL&STAF TM PHE: CPT

## 2021-05-18 PROCEDURE — 99395 PREV VISIT EST AGE 18-39: CPT | Mod: 25

## 2021-05-18 RX ORDER — MELOXICAM 7.5 MG/1
7.5 TABLET ORAL
Qty: 10 | Refills: 0 | Status: COMPLETED | COMMUNITY
Start: 2020-10-27 | End: 2021-05-18

## 2021-05-18 RX ORDER — CHOLECALCIFEROL (VITAMIN D3) 1250 MCG
1.25 MG CAPSULE ORAL
Qty: 4 | Refills: 2 | Status: COMPLETED | COMMUNITY
Start: 2020-03-03 | End: 2021-05-18

## 2021-05-18 NOTE — HISTORY OF PRESENT ILLNESS
[FreeTextEntry1] : CPE  [de-identified] : Pt is a 34 yr old woman PMH of migraine, murmur, s/p lumpectomy who came for her cpe.

## 2021-05-18 NOTE — ASSESSMENT
[FreeTextEntry1] : health -  She is up to date with gyn , mammogram needs eye exam and dental exam \par 2 bmi 24  Weight loss, exercise, and diet control were discussed and are highly encouraged. Treatment options were given such as, aqua therapy, and contacting a nutritionist. Recommended to use the elliptical, stationary bike, less use of treadmill. Mindful eating was explained to the patient Obesity is associated with worsening asthma, shortness of breath, and potential for cardiac disease, diabetes, and other underlying medical conditions.\par 3 breast nodules repeat mammogram in Nov 2021 \par 4 anemia fu iron ferrtin cbc.  \par 5 allergies - Once a person's trigger(s) have been identified, the next step is to reduce exposure to those specific allergens. Triggers may be present at work or at home, although for most people, the home environment is the primary source. It is especially important to reduce exposure to triggers in the bedroom because most people spend a significant number of hours there. However, to be effective, changes must be made throughout the entire home Dust mites — Dust mites are a microscopic type of insect that live in bedding, sofas, carpets, or any woven material. Dust mites do not bite and do not cause harm to humans, other than by triggering allergies.\par Mites absorb humidity from the atmosphere (ie, they do not drink) and feed on organic matter (including shed human and animal skin). They require sufficient humidity and nests to live in (which are not visible with the naked eye). Dust mite infestation is less common in dry climates, such as the [place].\par Exposure to dust mites can be reduced by encasing pillows, mattresses, box springs, comforters, and furniture in mite-impermeable barriers. When covering crib mattresses and children's mattresses and pillows, only tight-fitting, commercial covers intended for this purpose should be used. Homemade covers (for example, plastic sheeting fastened with duct tape) should not be used in children's beds, as these can come apart, and children can become trapped or suffocate. Tightly-woven fabrics with a pore size of 6 microns or less are very effective at controlling the passage of mite as well as cat allergens. Fabrics with a pore size greater than 2 microns still permit airflow Mites can be eliminated by washing sheets and blankets weekly in warm water with detergent or by drying them in an electric dryer on the hot setting Exposure can be further reduced by vacuuming with a vacuum  equipped with a high-efficiency particulate air (HEPA) filter, dusting regularly, and not sleeping on upholstered furniture (eg, couches). However, studies have yet to show that physical or chemical cleaning methods reduce mite levels to a degree that improves symptoms Indoor humidity levels should be kept between 30 and 50 percent. Inexpensive humidity monitors can be purchased at most hardware stores. Humidifiers make the problem worse and are not recommended.\par When possible, the amount of clutter, carpet, upholstered furniture, and drapes should be minimized, and horizontal blinds should be eliminated in the rooms where the person spends the most time (bedroom, study, television room). Washable vinyl, roller-type shades are optimal. For children, the number of stuffed toys in the bedroom should be minimized.\par Animal dander — Animal dander is made up of the dead skin cells or scales (like dandruff) that are constantly shed by animals. Any breed of dog or cat is capable of being allergenic, although the levels given off by individual animals may vary to some degree. In cats, the protein that causes most people's allergies is found in the cat's saliva, skin glands, and urinary/reproductive tract. Accordingly, short-haired cats are not necessarily less allergenic than long-haired animals, and furless cats have allergen levels similar to furred cats.\par Other animals, such as rodents, birds, and ferrets, can also trigger symptoms in an allergic individual. Pets without feathers or fur, such as reptiles, turtles, and fish, rarely cause allergy, although deposits of fish food that build up under the covers of fish tanks are an excellent source of food for dust mite colonies.\par If a person is found to be allergic to a pet, the most effective option is to remove the pet from the home. Limiting an animal to a certain area in the house is not effective, because allergens are carried on clothing or spread in the air. Once a pet has left a home, careful cleaning (or removal) of carpets, sofas, curtains, and bedding must follow. This is particularly true for cat allergens because they are "sticky" and adhere to a variety of indoor surfaces. Even after a cat has been removed from a home and it has been thoroughly cleaned, it can take months for the level of cat allergen to drop. For this reason, it may take months for the person's symptoms to fully reflect the absence of the pet.\par If it is not possible to remove the animal, measures can be taken to decrease exposure to the animal dander (although none of these methods are as effective as removing the animal. Vacuum  with a HEPA filter are effective in reducing cat and dog allergen levels in the home and can reduce symptoms Rodents — Mice and rats have proteins in their urine that can cause allergies. This applies to rodents that live in a laboratory setting, as well as rodents that live in the wild.\par To reduce rodent allergen levels significantly, a combination of pest control methods, in addition to pesticides (eg, poison baits), are usually necessary. This includes keeping food and trash in covered containers, cleaning food scraps from the floor and countertops, and sealing cracks in the walls, doors, and floors.\par Cockroaches — Cockroach droppings contain allergens that can trigger asthma and allergic rhinitis in sensitive individuals. Cockroaches thrive in warm, moist environments with easily accessible food and water. Unfortunately, efforts to control cockroach populations in infested areas are often less than successful. Still, certain measures are recommended:\par ?Use multiple baited traps or poisons\par ?Remove garbage and food waste promptly from the home\par ?Wash dishes and cooking utensils immediately after use\par ?Remove cockroach debris quickly\par ?Eliminate any standing water from leaking faucets or drains\par ?Keep humidity levels less than 50 percent with a dehumidifier or air conditioner\par ?Consult a professional  for large or recurrent infestations\par  ladybugs — Asian ladybugs were previously imported to the [place] as a biologic means of controlling aphids. It was anticipated that the insects would not survive the cold of winter. However, they adapted by moving inside houses when temperatures drop in the early fall. Allergies to Asian ladybugs have been increasingly reported as a source of seasonal indoor respiratory symptoms, particularly chronic cough, rhinitis, and asthma. Most cases have been reported in rural areas of the central, [place], and [place]. The insects can also bite and cause local reactions.\par  ladybugs enter homes through external cracks and crevices and then infest spaces within walls. They secrete a brown liquid that may stain walls and produce an unpleasant smell.\par Treating the exterior of the house with a chemical (pyrethroids) before cold weather arrives can prevent swarming of the ladybugs inside the home. Pyrethroids are similar to pyrethrins, which are derived from marigold flowers. Pyrethrins are broken down by the sun and do not significantly affect groundwater quality.\par Indoor molds — Mold spores can trigger symptoms of allergic rhinitis in allergic patients. Mold thrives in damp environments. Areas, such as air conditioning vents, water traps, refrigerator drip trays, shower stalls, leaky sinks, and damp basements, are particularly vulnerable to mold growth if not cleaned regularly. Most of the mold spores enter the home from the outside air. However, under certain circums\par

## 2021-05-18 NOTE — HEALTH RISK ASSESSMENT
[Good] : ~his/her~  mood as  good [Yes] : Yes [Monthly or less (1 pt)] : Monthly or less (1 point) [Never (0 pts)] : Never (0 points) [No falls in past year] : Patient reported no falls in the past year [0] : 2) Feeling down, depressed, or hopeless: Not at all (0) [Patient reported bone density results were normal] : Patient reported bone density results were normal [HIV Test offered] : HIV Test offered [Hepatitis C test offered] : Hepatitis C test offered [None] : None [With Family] : lives with family [# of Members in Household ___] :  household currently consist of [unfilled] member(s) [Employed] : employed [College] : College [] :  [# Of Children ___] : has [unfilled] children [Feels Safe at Home] : Feels safe at home [Fully functional (bathing, dressing, toileting, transferring, walking, feeding)] : Fully functional (bathing, dressing, toileting, transferring, walking, feeding) [Reports normal functional visual acuity (ie: able to read med bottle)] : Reports normal functional visual acuity [Smoke Detector] : smoke detector [Carbon Monoxide Detector] : carbon monoxide detector [Safety elements used in home] : safety elements used in home [Seat Belt] :  uses seat belt [Sunscreen] : uses sunscreen [Designated Healthcare Proxy] : Designated healthcare proxy [FreeTextEntry1] : none  [] : No [de-identified] : 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 Hodge, Cardiologist Dr. Live [de-identified] : weight lifting  [de-identified] : regular [[id number]] : 0 [Patient reported mammogram was abnormal] : Patient reported mammogram was abnormal [Patient reported PAP Smear was abnormal] : Patient reported PAP Smear was abnormal [Change in mental status noted] : No change in mental status noted [Language] : denies difficulty with language [Behavior] : denies difficulty with behavior [Learning/Retaining New Information] : denies difficulty learning/retaining new information [Handling Complex Tasks] : denies difficulty handling complex tasks [Reasoning] : denies difficulty with reasoning [Spatial Ability and Orientation] : denies difficulty with spatial ability and orientation [Sexually Active] : sexually active [Reports changes in hearing] : Reports no changes in hearing [Reports changes in vision] : Reports no changes in vision [Reports changes in dental health] : Reports no changes in dental health [Guns at Home] : no guns at home [Travel to Developing Areas] : does not  travel to developing areas [TB Exposure] : is not being exposed to tuberculosis [Caregiver Concerns] : does not have caregiver concerns [MammogramDate] : 11/12/20 [PapSmearDate] : 02/22/21 [PapSmearComments] : bacterail vaginosis  [BoneDensityDate] : 08/10/19 [HepatitisCDate] : 3/20 [HepatitisCComments] : neg [FreeTextEntry2] :  [de-identified] : 2019 last exam  [de-identified] : 2020 [AdvancecareDate] : 5/21

## 2021-05-18 NOTE — PHYSICAL EXAM
[Well Developed] : well developed [Well Nourished] : well nourished [Healthy] : healthy appearing [Normal Voice Quality] : was normal [Dysphonia] : was not dysphonic [Harsh] : was not harsh [Hoarse] : was not hoarse [Normal Verbal Skills] : the patient had normal verbal communication skills [Normal Nonverbal Skills] : normal nonverbal communication skills were demonstrated [Peripheral Vision Was Full To Confrontation Bilaterally] : visual fields were full to confrontation bilaterally [Conjunctiva] : the conjunctiva were normal in both eyes [PERRL] : pupils were equal in size, round, and reactive to light [EOM Intact] : extraocular movements were intact [] : the left cornea is normal in appearance [Normal Outer Ear/Nose] : the outer ears and nose were normal in appearance [Normal Oropharynx] : the oropharynx was normal [Normal TMs] : both tympanic membranes were normal [Normal Appearance] : was normal in appearance [JVP Elevated ___cm] : the JVP was not elevated [Rate ___] : at [unfilled] breaths per minute [Normal Rhythm/Effort] : normal respiratory rhythm and effort [Clear Bilaterally] : the lungs were clear to auscultation bilaterally [Normal Breath Sounds] : breath sounds were normal [Normal to Percussion] : the lungs were normal to percussion [5th Left ICS - MCL] : palpated at the 5th LICS in the midclavicular line [Heart Rate ___] : [unfilled] bpm [Apical Thrill] : no thrill palpable at the apex [Normal Rate] : normal [II] : a grade 2 [No Pitting Edema] : no pitting edema present [Rt] : no varicose veins of the right leg [Lt] : no varicose veins of the left leg [Right Carotid Bruit] : no bruit heard over the right carotid [Left Carotid Bruit] : no bruit heard over the left carotid [Right Femoral Bruit] : no bruit heard over the right femoral artery [Left Femoral Bruit] : no bruit heard over the left femoral artery [2+] : left 2+ [No Abnormalities] : the abdominal aorta was not enlarged and no bruit was heard [Bruit] : no bruit heard [No Discharge] : no discharge [Soft, Nontender] : the abdomen was soft and nontender [No Mass] : no masses were palpated [No HSM] : no hepatosplenomegaly noted [None] : no CVA tenderness [Preauricular Lymph Nodes Enlarged Bilaterally] : nodes not enlarged [Postauricular Lymph Nodes Enlarged Bilaterally] : nodes not enlarged [Submandibular Lymph Nodes Enlarged Bilaterally] : nodes not enlarged [Suboccipital Lymph Nodes Enlarged Bilaterally] : nodes not enlarged [Submental Lymph Nodes Enlarged] : nodes not enlarged [Cervical Lymph Nodes Enlarged Posterior Bilaterally] : nodes not enlarged [Cervical Lymph Nodes Enlarged Anterior Bilaterally] : nodes not enlarged [Axillary Lymph Nodes Enlarged Bilaterally] : nodes not enlarged [Supraclavicular Lymph Nodes Enlarged Bilaterally] : nodes not enlarged [Epitrochlear Lymph Nodes Enlarged Bilaterally] : nodes not enlarged [Femoral Lymph Nodes Enlarged Bilaterally] : nodes not enlarged [Inguinal Lymph Nodes Enlarged Bilaterally] : nodes not enlarged [No Lymphangitis] : no lymphangitis observed [Normal Kyphosis] : normal kyphosis [No Visual Abnormalities] : no visible abnormalities [Normal Lordosis] : normal lordosis [No Scoliosis] : no scoliosis [No Tenderness to Palpation] : no spine tenderness on palpation [No Masses] : no masses [Full ROM] : full ROM [No Pain with ROM] : no pain with motion in any direction [Intact] : all reflexes within normal limits bilaterally [Normal Station and Gait] : the gait and station were normal [Normal Motor Tone] : the muscle tone was normal [Involuntary Movements] : no involuntary movements were seen [Normal Scalp] : inspection of the scalp showed no abnormalities [Examination Of The Hair] : texture and distribution of hair was normal [Abnormal Color] : normal color and pigmentation [Complexion Medium] : medium complexion [Skin Lesions 1] : no skin lesions were observed [Multiple Tattoos] : multiple tattoos observed [Skin Turgor Decreased] : normal skin turgor [Normal] : the deep tendon reflexes were normal [Normal Mental Status] : the patient's orientation, memory, attention, language and fund of knowledge were normal [Appropriate] : appropriate [Impaired judgment] : intact judgment [Impaired Insight] : intact insight [de-identified] : done by gyn  [de-identified] : left sided nasal polyp and enlarged meatus bilaterally  tongue normal  teeth in good repair.

## 2021-05-18 NOTE — PAST MEDICAL HISTORY
[Menstruating] : menstruating [Menarche Age ____] : age at menarche was [unfilled] [Definite ___ (Date)] : the last menstrual period was [unfilled] [Regular Cycle Intervals] : have been regular [Total Preg ___] : G[unfilled] [Live Births ___] : P[unfilled]  [Abortions ___] : Abortions:[unfilled]

## 2021-05-18 NOTE — END OF VISIT
[] : Resident [FreeTextEntry3] : resident was present  for entire exam hx   [>50% of the face to face encounter time was spent on counseling and/or coordination of care for ___] : Greater than 50% of the face to face encounter time was spent on counseling and/or coordination of care for [unfilled]

## 2021-05-18 NOTE — COUNSELING
[Sleep ___ hours/day] : Sleep [unfilled] hours/day [Engage in a relaxing activity] : Engage in a relaxing activity [Plan in advance] : Plan in advance [Weigh Self Weekly] : weigh self weekly [Decrease Portions] : decrease portions [____ min/wk Activity] : [unfilled] min/wk activity [Keep Food Diary] : keep food diary [FreeTextEntry2] : ideal 101-136  she is 89

## 2021-05-19 ENCOUNTER — RESULT REVIEW (OUTPATIENT)
Age: 36
End: 2021-05-19

## 2021-05-19 LAB
25(OH)D3 SERPL-MCNC: 37.3 NG/ML
ALBUMIN SERPL ELPH-MCNC: 4.1 G/DL
ALP BLD-CCNC: 83 U/L
ALT SERPL-CCNC: 12 U/L
ANION GAP SERPL CALC-SCNC: 12 MMOL/L
APPEARANCE: CLEAR
AST SERPL-CCNC: 19 U/L
BASOPHILS # BLD AUTO: 0.02 K/UL
BASOPHILS NFR BLD AUTO: 0.3 %
BILIRUB SERPL-MCNC: 0.3 MG/DL
BILIRUBIN URINE: NEGATIVE
BLOOD URINE: ABNORMAL
BUN SERPL-MCNC: 9 MG/DL
CALCIUM SERPL-MCNC: 9.5 MG/DL
CHLORIDE SERPL-SCNC: 104 MMOL/L
CHOLEST SERPL-MCNC: 177 MG/DL
CO2 SERPL-SCNC: 24 MMOL/L
COLOR: YELLOW
CREAT SERPL-MCNC: 0.84 MG/DL
EOSINOPHIL # BLD AUTO: 0.19 K/UL
EOSINOPHIL NFR BLD AUTO: 2.5 %
FERRITIN SERPL-MCNC: 17 NG/ML
GLUCOSE QUALITATIVE U: NEGATIVE
GLUCOSE SERPL-MCNC: 86 MG/DL
HCT VFR BLD CALC: 41.5 %
HDLC SERPL-MCNC: 57 MG/DL
HGB BLD-MCNC: 12.1 G/DL
HIV1+2 AB SPEC QL IA.RAPID: NONREACTIVE
IMM GRANULOCYTES NFR BLD AUTO: 0.1 %
IRON SATN MFR SERPL: 20 %
IRON SERPL-MCNC: 90 UG/DL
KETONES URINE: NEGATIVE
LDLC SERPL CALC-MCNC: 98 MG/DL
LEUKOCYTE ESTERASE URINE: NEGATIVE
LYMPHOCYTES # BLD AUTO: 2.85 K/UL
LYMPHOCYTES NFR BLD AUTO: 37.8 %
MAN DIFF?: NORMAL
MCHC RBC-ENTMCNC: 27.8 PG
MCHC RBC-ENTMCNC: 29.2 GM/DL
MCV RBC AUTO: 95.4 FL
MONOCYTES # BLD AUTO: 0.4 K/UL
MONOCYTES NFR BLD AUTO: 5.3 %
NEUTROPHILS # BLD AUTO: 4.07 K/UL
NEUTROPHILS NFR BLD AUTO: 54 %
NITRITE URINE: NEGATIVE
NONHDLC SERPL-MCNC: 119 MG/DL
PH URINE: 6
PLATELET # BLD AUTO: 368 K/UL
POTASSIUM SERPL-SCNC: 4.8 MMOL/L
PROT SERPL-MCNC: 7.2 G/DL
PROTEIN URINE: NORMAL
RBC # BLD: 4.35 M/UL
RBC # FLD: 13.2 %
SODIUM SERPL-SCNC: 140 MMOL/L
SPECIFIC GRAVITY URINE: 1.02
T PALLIDUM AB SER QL IA: NEGATIVE
TIBC SERPL-MCNC: 452 UG/DL
TRIGL SERPL-MCNC: 106 MG/DL
UIBC SERPL-MCNC: 362 UG/DL
UROBILINOGEN URINE: NORMAL
WBC # FLD AUTO: 7.54 K/UL

## 2021-10-14 ENCOUNTER — APPOINTMENT (OUTPATIENT)
Dept: INTERNAL MEDICINE | Facility: CLINIC | Age: 36
End: 2021-10-14

## 2021-10-14 VITALS
WEIGHT: 138 LBS | TEMPERATURE: 96.6 F | HEART RATE: 76 BPM | HEIGHT: 62 IN | OXYGEN SATURATION: 98 % | BODY MASS INDEX: 25.4 KG/M2 | SYSTOLIC BLOOD PRESSURE: 108 MMHG | DIASTOLIC BLOOD PRESSURE: 71 MMHG

## 2021-10-14 RX ORDER — CIPROFLOXACIN HYDROCHLORIDE 250 MG/1
250 TABLET, FILM COATED ORAL
Qty: 14 | Refills: 0 | Status: COMPLETED | COMMUNITY
Start: 2021-05-23 | End: 2021-10-14

## 2021-11-09 ENCOUNTER — APPOINTMENT (OUTPATIENT)
Dept: INTERNAL MEDICINE | Facility: CLINIC | Age: 36
End: 2021-11-09
Payer: COMMERCIAL

## 2021-11-09 ENCOUNTER — LABORATORY RESULT (OUTPATIENT)
Age: 36
End: 2021-11-09

## 2021-11-09 VITALS
WEIGHT: 138 LBS | OXYGEN SATURATION: 98 % | DIASTOLIC BLOOD PRESSURE: 59 MMHG | SYSTOLIC BLOOD PRESSURE: 104 MMHG | HEART RATE: 81 BPM | BODY MASS INDEX: 25.4 KG/M2 | HEIGHT: 62 IN | TEMPERATURE: 98.6 F

## 2021-11-09 DIAGNOSIS — J34.2 DEVIATED NASAL SEPTUM: ICD-10-CM

## 2021-11-09 PROCEDURE — 93000 ELECTROCARDIOGRAM COMPLETE: CPT

## 2021-11-09 PROCEDURE — 99215 OFFICE O/P EST HI 40 MIN: CPT | Mod: 25

## 2021-11-09 NOTE — RESULTS/DATA
[ECG Reviewed] : reviewed [Normal] : The 12 - lead ECG is normal [NSR] : normal sinus rhythm [Ventricular Rate___] : ventricular rate is [unfilled] beats per minute [P Waves Normal] : the P wave is normal [ECG Intervals WA.] : WA interval is normal [VT Interval___] : [unfilled] seconds [Normal QRS] : the QRS is normal [QRS Interval___] : QRS interval: [unfilled] seconds [ECG Axis] : the QRS axis is normal [QTc Interval___] : QTc interval: [unfilled] [Normal ST Segments] : the ST segments are normal [ECG T. Waves] : normal

## 2021-11-10 LAB
ALBUMIN SERPL ELPH-MCNC: 4.1 G/DL
ALP BLD-CCNC: 78 U/L
ALT SERPL-CCNC: 9 U/L
ANION GAP SERPL CALC-SCNC: 13 MMOL/L
APPEARANCE: CLEAR
APTT BLD: 32.3 SEC
AST SERPL-CCNC: 15 U/L
BASOPHILS # BLD AUTO: 0.03 K/UL
BASOPHILS NFR BLD AUTO: 0.3 %
BILIRUB SERPL-MCNC: 0.2 MG/DL
BILIRUBIN URINE: NEGATIVE
BLOOD URINE: ABNORMAL
BUN SERPL-MCNC: 9 MG/DL
CALCIUM SERPL-MCNC: 9.4 MG/DL
CHLORIDE SERPL-SCNC: 102 MMOL/L
CO2 SERPL-SCNC: 23 MMOL/L
COLOR: YELLOW
CREAT SERPL-MCNC: 0.84 MG/DL
EOSINOPHIL # BLD AUTO: 0.08 K/UL
EOSINOPHIL NFR BLD AUTO: 0.8 %
GLUCOSE QUALITATIVE U: NEGATIVE
GLUCOSE SERPL-MCNC: 70 MG/DL
HCT VFR BLD CALC: 36.6 %
HGB BLD-MCNC: 11.6 G/DL
IMM GRANULOCYTES NFR BLD AUTO: 0.3 %
INR PPP: 0.96 RATIO
KETONES URINE: NEGATIVE
LEUKOCYTE ESTERASE URINE: NEGATIVE
LYMPHOCYTES # BLD AUTO: 3.45 K/UL
LYMPHOCYTES NFR BLD AUTO: 34.6 %
MAN DIFF?: NORMAL
MCHC RBC-ENTMCNC: 28.7 PG
MCHC RBC-ENTMCNC: 31.7 GM/DL
MCV RBC AUTO: 90.6 FL
MONOCYTES # BLD AUTO: 0.6 K/UL
MONOCYTES NFR BLD AUTO: 6 %
NEUTROPHILS # BLD AUTO: 5.79 K/UL
NEUTROPHILS NFR BLD AUTO: 58 %
NITRITE URINE: NEGATIVE
PH URINE: 6
PLATELET # BLD AUTO: 356 K/UL
POTASSIUM SERPL-SCNC: 4.5 MMOL/L
PROT SERPL-MCNC: 7.2 G/DL
PROTEIN URINE: NORMAL
PT BLD: 11.4 SEC
RBC # BLD: 4.04 M/UL
RBC # FLD: 13.7 %
SODIUM SERPL-SCNC: 139 MMOL/L
SPECIFIC GRAVITY URINE: 1.02
UROBILINOGEN URINE: NORMAL
WBC # FLD AUTO: 9.98 K/UL

## 2021-11-11 ENCOUNTER — RESULT REVIEW (OUTPATIENT)
Age: 36
End: 2021-11-11

## 2021-11-11 ENCOUNTER — APPOINTMENT (OUTPATIENT)
Dept: MAMMOGRAPHY | Facility: CLINIC | Age: 36
End: 2021-11-11
Payer: COMMERCIAL

## 2021-11-11 ENCOUNTER — APPOINTMENT (OUTPATIENT)
Dept: ULTRASOUND IMAGING | Facility: CLINIC | Age: 36
End: 2021-11-11
Payer: COMMERCIAL

## 2021-11-11 ENCOUNTER — OUTPATIENT (OUTPATIENT)
Dept: OUTPATIENT SERVICES | Facility: HOSPITAL | Age: 36
LOS: 1 days | End: 2021-11-11
Payer: COMMERCIAL

## 2021-11-11 DIAGNOSIS — R92.8 OTHER ABNORMAL AND INCONCLUSIVE FINDINGS ON DIAGNOSTIC IMAGING OF BREAST: ICD-10-CM

## 2021-11-11 DIAGNOSIS — Z00.8 ENCOUNTER FOR OTHER GENERAL EXAMINATION: ICD-10-CM

## 2021-11-11 PROCEDURE — 76641 ULTRASOUND BREAST COMPLETE: CPT | Mod: 26,50

## 2021-11-11 PROCEDURE — 77067 SCR MAMMO BI INCL CAD: CPT | Mod: 26

## 2021-11-11 PROCEDURE — 77063 BREAST TOMOSYNTHESIS BI: CPT | Mod: 26

## 2021-11-11 PROCEDURE — 77067 SCR MAMMO BI INCL CAD: CPT

## 2021-11-11 PROCEDURE — 76641 ULTRASOUND BREAST COMPLETE: CPT

## 2021-11-11 PROCEDURE — 77063 BREAST TOMOSYNTHESIS BI: CPT

## 2021-11-17 LAB
HCG SERPL-MCNC: <1 MIU/ML
SARS-COV-2 N GENE NPH QL NAA+PROBE: NOT DETECTED

## 2021-11-17 NOTE — ASSESSMENT
[High Risk Surgery - Intraperitoneal, Intrathoracic or Supringuinal Vascular Procedures] : High Risk Surgery - Intraperitoneal, Intrathoracic or Supringuinal Vascular Procedures - No (0) [Ischemic Heart Disease] : Ischemic Heart Disease - No (0) [Congestive Heart Failure] : Congestive Heart Failure - No (0) [Prior Cerebrovascular Accident or TIA] : Prior Cerebrovascular Accident or TIA - No (0) [Creatinine >= 2mg/dL (1 Point)] : Creatinine >= 2mg/dL - No (0) [Insulin-dependent Diabetic (1 Point)] : Insulin-dependent Diabetic - No (0) [0] : 0 , RCRI Class: I, Risk of Post-Op Cardiac Complications: 3.9%, 95% CI for Risk Estimate: 2.8% - 5.4% [Modify medications prior to procedure] : Modify medications prior to procedure [As per surgery] : as per surgery [FreeTextEntry4] : preop  Pt has been explained the surgeries, risks anesthesia, complications alternatives and all her questions have been answered by surgeon.  She is having a low risks procedure  and  low  to intermediate risk surgery and is a low risk for post operative cardiac event within 30 days.  Her  RCRI score is 0 .    LMP  Oct 24, 21 She will need a hcg test 48 hrs prior to surgery and covid pcr 3 days prior  and lab testing today .  Her asa is  1.    I will medically clear pt once I have reviewed all testing done  and mammogram and us of breasts [FreeTextEntry7] : none stop all vitamins and minerals and otc from today on she can continue her birth control

## 2021-11-17 NOTE — HISTORY OF PRESENT ILLNESS
[No Pertinent Cardiac History] : no history of aortic stenosis, atrial fibrillation, coronary artery disease, recent myocardial infarction, or implantable device/pacemaker [No Pertinent Pulmonary History] : no history of asthma, COPD, sleep apnea, or smoking [No Adverse Anesthesia Reaction] : no adverse anesthesia reaction in self or family member [(Patient denies any chest pain, claudication, dyspnea on exertion, orthopnea, palpitations or syncope)] : Patient denies any chest pain, claudication, dyspnea on exertion, orthopnea, palpitations or syncope [Chronic Anticoagulation] : no chronic anticoagulation [Chronic Kidney Disease] : no chronic kidney disease [Diabetes] : no diabetes [FreeTextEntry1] : excision of excess axillary  septoplasty tissue on right  and rhinoplasty [FreeTextEntry2] : 11/18/21 [FreeTextEntry3] : Dr FARZANEH Washington [FreeTextEntry4] : Pt is a 36 yr old woman who has  heart murmur  and anemia who is here for medical clearance.for removal of excess  axillary tissue and rhinoplasty and septoplasty .   Pt -denies any headaches, nausea, vomiting, fever, chills, sweats, chest pain, chest pressure, diarrhea, constipation, dysphagia, sour taste in the mouth, dizziness, leg swelling, leg pain, myalgias, arthralgias, itchy eyes, itchy ears, heartburn, or reflux.\par \par \par

## 2021-11-17 NOTE — PHYSICAL EXAM
[Well Developed] : well developed [Well Nourished] : well nourished [Rate ___] : at [unfilled] breaths per minute [Normal Rhythm/Effort] : normal respiratory rhythm and effort [Clear Bilaterally] : the lungs were clear to auscultation bilaterally [Normal to Percussion] : the lungs were normal to percussion [5th Left ICS - MCL] : palpated at the 5th LICS in the midclavicular line [Heart Rate ___] : [unfilled] bpm [Rhythm Regular] : regular [Normal Rate] : normal [Normal S1] : normal S1 [Normal S2] : normal S2 [II] : a grade 2 [No Pitting Edema] : no pitting edema present [2+] : left 2+ [No Abnormalities] : the abdominal aorta was not enlarged and no bruit was heard [Examination Of The Breasts] : a normal appearance [No Masses] : no breast masses were palpable [Soft, Nontender] : the abdomen was soft and nontender [No Mass] : no masses were palpated [No HSM] : no hepatosplenomegaly noted [Normal Station and Gait] : the gait and station were normal [Normal Motor Tone] : the muscle tone was normal [Involuntary Movements] : no involuntary movements were seen [Normal Scalp] : inspection of the scalp showed no abnormalities [Examination Of The Hair] : texture and distribution of hair was normal [Complexion Medium] : medium complexion [Normal] : normal gait, coordination grossly intact, no focal deficits and deep tendon reflexes were 2+ and symmetric [Normal Mental Status] : the patient's orientation, memory, attention, language and fund of knowledge were normal [Appropriate] : appropriate [S3] : no S3 [S4] : no S4 [Right Carotid Bruit] : no bruit heard over the right carotid [Left Carotid Bruit] : no bruit heard over the left carotid [Right Femoral Bruit] : no bruit heard over the right femoral artery [Left Femoral Bruit] : no bruit heard over the left femoral artery [Abnormal Color] : normal color and pigmentation [Skin Lesions 1] : no skin lesions were observed [Skin Turgor Decreased] : normal skin turgor [Impaired judgment] : intact judgment [Impaired Insight] : intact insight [de-identified] : deviated septum to right

## 2021-11-18 ENCOUNTER — APPOINTMENT (OUTPATIENT)
Dept: INTERNAL MEDICINE | Facility: CLINIC | Age: 36
End: 2021-11-18

## 2021-12-02 ENCOUNTER — APPOINTMENT (OUTPATIENT)
Dept: INTERNAL MEDICINE | Facility: CLINIC | Age: 36
End: 2021-12-02

## 2022-01-02 ENCOUNTER — APPOINTMENT (OUTPATIENT)
Dept: AFTER HOURS CARE | Facility: EMERGENCY ROOM | Age: 37
End: 2022-01-02
Payer: COMMERCIAL

## 2022-01-03 PROCEDURE — 99202 OFFICE O/P NEW SF 15 MIN: CPT | Mod: 95

## 2022-01-05 NOTE — REVIEW OF SYSTEMS
[Dysuria] : dysuria [Incontinence] : incontinence [Frequency] : frequency [Fever] : no fever [Chills] : no chills [Discharge] : no discharge [Earache] : no earache [Chest Pain] : no chest pain [Shortness Of Breath] : no shortness of breath [Abdominal Pain] : no abdominal pain [Nausea] : no nausea [Hematuria] : no hematuria [Joint Pain] : no joint pain [Muscle Pain] : no muscle pain [Itching] : no itching [Skin Rash] : no skin rash [Headache] : no headache [Dizziness] : no dizziness

## 2022-01-05 NOTE — PHYSICAL EXAM
[No Acute Distress] : no acute distress [EOMI] : extraocular movements intact [Normal Outer Ear/Nose] : the outer ears and nose were normal in appearance [Supple] : supple [No Respiratory Distress] : no respiratory distress  [Normal Insight/Judgement] : insight and judgment were intact [de-identified] : moves upper ext  [de-identified] : no rash on face  [de-identified] : alert and ointed with clear speech

## 2022-01-05 NOTE — PLAN
[With new medications prescribed] : Treat in place: with new medications prescribed [Primary Care/Internal Medicine/PMD] : Primary Care/Internal Medicine/PMD [FreeTextEntry1] : 35 yo f with uti with no apparent complications will treat with abx

## 2022-01-05 NOTE — HISTORY OF PRESENT ILLNESS
[Home] : at home, [unfilled] , at the time of the visit. [Other Location: e.g. Home (Enter Location, City,State)___] : at [unfilled] [Verbal consent obtained from patient] : the patient, [unfilled] [FreeTextEntry8] : 37 yo f with one day of urgency frequency with no fever or chills or back pain

## 2022-08-19 ENCOUNTER — OUTPATIENT (OUTPATIENT)
Dept: OUTPATIENT SERVICES | Facility: HOSPITAL | Age: 37
LOS: 1 days | End: 2022-08-19
Payer: COMMERCIAL

## 2022-08-19 ENCOUNTER — APPOINTMENT (OUTPATIENT)
Dept: MAMMOGRAPHY | Facility: CLINIC | Age: 37
End: 2022-08-19

## 2022-08-19 ENCOUNTER — APPOINTMENT (OUTPATIENT)
Dept: ULTRASOUND IMAGING | Facility: CLINIC | Age: 37
End: 2022-08-19

## 2022-08-19 DIAGNOSIS — Z00.00 ENCOUNTER FOR GENERAL ADULT MEDICAL EXAMINATION WITHOUT ABNORMAL FINDINGS: ICD-10-CM

## 2022-08-19 PROCEDURE — 76642 ULTRASOUND BREAST LIMITED: CPT | Mod: 26,LT

## 2022-08-19 PROCEDURE — 76642 ULTRASOUND BREAST LIMITED: CPT

## 2022-08-24 ENCOUNTER — RESULT REVIEW (OUTPATIENT)
Age: 37
End: 2022-08-24

## 2022-08-24 ENCOUNTER — LABORATORY RESULT (OUTPATIENT)
Age: 37
End: 2022-08-24

## 2022-08-24 ENCOUNTER — APPOINTMENT (OUTPATIENT)
Dept: INTERNAL MEDICINE | Facility: CLINIC | Age: 37
End: 2022-08-24

## 2022-08-24 VITALS
WEIGHT: 140.06 LBS | HEIGHT: 62 IN | HEART RATE: 68 BPM | SYSTOLIC BLOOD PRESSURE: 108 MMHG | DIASTOLIC BLOOD PRESSURE: 70 MMHG | BODY MASS INDEX: 25.77 KG/M2 | OXYGEN SATURATION: 99 % | TEMPERATURE: 98 F

## 2022-08-24 VITALS — DIASTOLIC BLOOD PRESSURE: 87 MMHG | SYSTOLIC BLOOD PRESSURE: 164 MMHG | HEART RATE: 67 BPM

## 2022-08-24 DIAGNOSIS — Z01.818 ENCOUNTER FOR OTHER PREPROCEDURAL EXAMINATION: ICD-10-CM

## 2022-08-24 DIAGNOSIS — Z82.49 FAMILY HISTORY OF ISCHEMIC HEART DISEASE AND OTHER DISEASES OF THE CIRCULATORY SYSTEM: ICD-10-CM

## 2022-08-24 DIAGNOSIS — R22.2 LOCALIZED SWELLING, MASS AND LUMP, TRUNK: ICD-10-CM

## 2022-08-24 PROCEDURE — 99395 PREV VISIT EST AGE 18-39: CPT

## 2022-08-24 NOTE — HEALTH RISK ASSESSMENT
[Excellent] : ~his/her~ current health as excellent [Good] : ~his/her~  mood as  good [Never] : Never [No] : In the past 12 months have you used drugs other than those required for medical reasons? No [No falls in past year] : Patient reported no falls in the past year [0] : 2) Feeling down, depressed, or hopeless: Not at all (0) [PHQ-2 Negative - No further assessment needed] : PHQ-2 Negative - No further assessment needed [Patient reported mammogram was abnormal] : Patient reported mammogram was abnormal [Patient reported PAP Smear was normal] : Patient reported PAP Smear was normal [Patient reported bone density results were normal] : Patient reported bone density results were normal [HIV test declined] : HIV test declined [Hepatitis C test offered] : Hepatitis C test offered [None] : None [With Family] : lives with family [# of Members in Household ___] :  household currently consist of [unfilled] member(s) [Employed] : employed [] :  [# Of Children ___] : has [unfilled] children [Sexually Active] : sexually active [Feels Safe at Home] : Feels safe at home [Fully functional (bathing, dressing, toileting, transferring, walking, feeding)] : Fully functional (bathing, dressing, toileting, transferring, walking, feeding) [Fully functional (using the telephone, shopping, preparing meals, housekeeping, doing laundry, using] : Fully functional and needs no help or supervision to perform IADLs (using the telephone, shopping, preparing meals, housekeeping, doing laundry, using transportation, managing medications and managing finances) [Smoke Detector] : smoke detector [Carbon Monoxide Detector] : carbon monoxide detector [Guns at Home] : guns at home [Safety elements used in home] : safety elements used in home [Seat Belt] :  uses seat belt [Sunscreen] : uses sunscreen [Caregiver Concerns] : has caregiver concerns [FreeTextEntry1] : none  [de-identified] : goes to gym 3 times a week. [de-identified] : healthy  [[id number]] : 0 [Change in mental status noted] : No change in mental status noted [Language] : denies difficulty with language [Handling Complex Tasks] : denies difficulty handling complex tasks [Reports changes in hearing] : Reports no changes in hearing [Reports changes in vision] : Reports no changes in vision [Reports changes in dental health] : Reports no changes in dental health [Travel to Developing Areas] : does not  travel to developing areas [TB Exposure] : is not being exposed to tuberculosis [MammogramDate] : 11/21 [MammogramComments] : lumps   [PapSmearDate] : 2/22 [BoneDensityDate] : 8/10/19 [FreeTextEntry2] :   [de-identified] : last eye exam  2021 has an appt  [de-identified] : last dental  6/22 [de-identified] : safe  is   [AdvancecareDate] : 08/22

## 2022-08-24 NOTE — ASSESSMENT
[FreeTextEntry1] : health   she is up to date with vaccines   She needs fu Nov  for mammogram , and needs eye exam and is up to date with dental   \par 2 bmi 25  Weight loss, exercise, and diet control were discussed and are highly encouraged. Treatment options were given such as, aqua therapy, and contacting a nutritionist. Recommended to use the elliptical, stationary bike, less use of treadmill. Mindful eating was explained to the patient Obesity is associated with worsening asthma, shortness of breath, and potential for cardiac disease, diabetes, and other underlying medical conditions.\par 3 breast nodule   mammogram us of breast in Nov.  \par 4. anemia   fu cbc iron

## 2022-08-24 NOTE — PHYSICAL EXAM
[Well Developed] : well developed [Well Nourished] : well nourished [Conjunctiva] : the conjunctiva were normal in both eyes [PERRL] : pupils were equal in size, round, and reactive to light [EOM Intact] : extraocular movements were intact [Normal Appearance] : was normal in appearance [Neck Supple] : was supple [Rate ___] : at [unfilled] breaths per minute [Normal Rhythm/Effort] : normal respiratory rhythm and effort [Clear Bilaterally] : the lungs were clear to auscultation bilaterally [Normal to Percussion] : the lungs were normal to percussion [5th Left ICS - MCL] : palpated at the 5th LICS in the midclavicular line [Heart Rate ___] : [unfilled] bpm [II] : a grade 2 [Normal Rate] : normal [Normal S1] : normal S1 [Normal S2] : normal S2 [No Pitting Edema] : no pitting edema present [2+] : left 2+ [No Abnormalities] : the abdominal aorta was not enlarged and no bruit was heard [Examination Of The Breasts] : a normal appearance [No Discharge] : no discharge [Flat] : flat [Soft, Nontender] : the abdomen was soft and nontender [No Mass] : no masses were palpated [No HSM] : no hepatosplenomegaly noted [None] : no CVA tenderness [No Lymphangitis] : no lymphangitis observed [Normal Kyphosis] : normal kyphosis [No Visual Abnormalities] : no visible abnormalities [Normal Lordosis] : normal lordosis [No Scoliosis] : no scoliosis [No Tenderness to Palpation] : no spine tenderness on palpation [No Masses] : no masses [Full ROM] : full ROM [No Pain with ROM] : no pain with motion in any direction [Intact] : all reflexes within normal limits bilaterally [Normal Station and Gait] : the gait and station were normal [Normal Motor Tone] : the muscle tone was normal [Involuntary Movements] : no involuntary movements were seen [Normal Scalp] : inspection of the scalp showed no abnormalities [Examination Of The Hair] : texture and distribution of hair was normal [Complexion Medium] : medium complexion [Umbilicus] : the umbilicus [Both Nipples Pierced For Ornamentation] : both nipples [Both Ears Pierced] : both ears [Multiple Tattoos] : multiple tattoos observed [Normal] : the deep tendon reflexes were normal [Normal Mental Status] : the patient's orientation, memory, attention, language and fund of knowledge were normal [Appropriate] : appropriate [Enlarged Diffusely] : was not enlarged [JVP Elevated ___cm] : the JVP was not elevated [S3] : no S3 [S4] : no S4 [Rt] : no varicose veins of the right leg [Lt] : no varicose veins of the left leg [Right Carotid Bruit] : no bruit heard over the right carotid [Left Carotid Bruit] : no bruit heard over the left carotid [Right Femoral Bruit] : no bruit heard over the right femoral artery [Left Femoral Bruit] : no bruit heard over the left femoral artery [Bruit] : no bruit heard [Postauricular Lymph Nodes Enlarged Bilaterally] : nodes not enlarged [Preauricular Lymph Nodes Enlarged Bilaterally] : nodes not enlarged [Submandibular Lymph Nodes Enlarged Bilaterally] : nodes not enlarged [Suboccipital Lymph Nodes Enlarged Bilaterally] : nodes not enlarged [Submental Lymph Nodes Enlarged] : nodes not enlarged [Cervical Lymph Nodes Enlarged Posterior Bilaterally] : nodes not enlarged [Cervical Lymph Nodes Enlarged Anterior Bilaterally] : nodes not enlarged [Supraclavicular Lymph Nodes Enlarged Bilaterally] : nodes not enlarged [Axillary Lymph Nodes Enlarged Bilaterally] : nodes not enlarged [Epitrochlear Lymph Nodes Enlarged Bilaterally] : nodes not enlarged [Femoral Lymph Nodes Enlarged Bilaterally] : nodes not enlarged [Inguinal Lymph Nodes Enlarged Bilaterally] : nodes not enlarged [Impaired judgment] : intact judgment [Impaired Insight] : intact insight [de-identified] : tongue  normal teeth in good repair  [FreeTextEntry1] : scar in right axilla  and nipple  piercing  [de-identified] : done by gyn

## 2022-08-24 NOTE — HISTORY OF PRESENT ILLNESS
[FreeTextEntry1] : cpe   [de-identified] : pt is a 37 yr old woman with hx of migraines, anemia, and heart murmur who is here for her cpe.   She states her dad  due to  heart attack at age  64 .   She -denies any headaches, nausea, vomiting, fever, chills, sweats, chest pain, chest pressure, diarrhea, constipation, dysphagia, sour taste in the mouth, dizziness, leg swelling, leg pain, myalgias, arthralgias, itchy eyes, itchy ears, heartburn, or reflux.\par \par \par

## 2022-08-24 NOTE — COUNSELING
[Sleep ___ hours/day] : Sleep [unfilled] hours/day [Engage in a relaxing activity] : Engage in a relaxing activity [Plan in advance] : Plan in advance [Potential consequences of obesity discussed] : Potential consequences of obesity discussed [Benefits of weight loss discussed] : Benefits of weight loss discussed [Structured Weight Management Program suggested:] : Structured weight management program suggested [Encouraged to maintain food diary] : Encouraged to maintain food diary [Encouraged to increase physical activity] : Encouraged to increase physical activity [Encouraged to use exercise tracking device] : Encouraged to use exercise tracking device [Weigh Self Weekly] : weigh self weekly [Decrease Portions] : decrease portions [____ min/wk Activity] : [unfilled] min/wk activity [Keep Food Diary] : keep food diary [FreeTextEntry1] : healthy eating

## 2022-08-25 LAB
25(OH)D3 SERPL-MCNC: 54 NG/ML
ALBUMIN SERPL ELPH-MCNC: 4.2 G/DL
ALP BLD-CCNC: 73 U/L
ALT SERPL-CCNC: 14 U/L
ANION GAP SERPL CALC-SCNC: 12 MMOL/L
AST SERPL-CCNC: 16 U/L
BASOPHILS # BLD AUTO: 0.03 K/UL
BASOPHILS NFR BLD AUTO: 0.3 %
BILIRUB SERPL-MCNC: 0.4 MG/DL
BUN SERPL-MCNC: 9 MG/DL
CALCIUM SERPL-MCNC: 9.5 MG/DL
CHLORIDE SERPL-SCNC: 105 MMOL/L
CHOLEST SERPL-MCNC: 196 MG/DL
CO2 SERPL-SCNC: 22 MMOL/L
CREAT SERPL-MCNC: 0.86 MG/DL
EGFR: 88 ML/MIN/1.73M2
EOSINOPHIL # BLD AUTO: 0.08 K/UL
EOSINOPHIL NFR BLD AUTO: 0.8 %
FERRITIN SERPL-MCNC: 13 NG/ML
GLUCOSE SERPL-MCNC: 89 MG/DL
HCT VFR BLD CALC: 38.9 %
HDLC SERPL-MCNC: 69 MG/DL
HGB BLD-MCNC: 12.1 G/DL
IMM GRANULOCYTES NFR BLD AUTO: 0.1 %
IRON SATN MFR SERPL: 20 %
IRON SERPL-MCNC: 99 UG/DL
LDLC SERPL CALC-MCNC: 104 MG/DL
LYMPHOCYTES # BLD AUTO: 3.21 K/UL
LYMPHOCYTES NFR BLD AUTO: 33.3 %
MAN DIFF?: NORMAL
MCHC RBC-ENTMCNC: 28.3 PG
MCHC RBC-ENTMCNC: 31.1 GM/DL
MCV RBC AUTO: 90.9 FL
MONOCYTES # BLD AUTO: 0.43 K/UL
MONOCYTES NFR BLD AUTO: 4.5 %
NEUTROPHILS # BLD AUTO: 5.88 K/UL
NEUTROPHILS NFR BLD AUTO: 61 %
NONHDLC SERPL-MCNC: 127 MG/DL
PLATELET # BLD AUTO: 410 K/UL
POTASSIUM SERPL-SCNC: 4.7 MMOL/L
PROT SERPL-MCNC: 7.1 G/DL
RBC # BLD: 4.28 M/UL
RBC # FLD: 14.2 %
SODIUM SERPL-SCNC: 139 MMOL/L
T PALLIDUM AB SER QL IA: NEGATIVE
TIBC SERPL-MCNC: 485 UG/DL
TRIGL SERPL-MCNC: 115 MG/DL
UIBC SERPL-MCNC: 387 UG/DL
WBC # FLD AUTO: 9.64 K/UL

## 2022-08-29 LAB
APPEARANCE: CLEAR
BILIRUBIN URINE: NEGATIVE
BLOOD URINE: ABNORMAL
COLOR: YELLOW
GLUCOSE QUALITATIVE U: NEGATIVE
KETONES URINE: NEGATIVE
LEUKOCYTE ESTERASE URINE: NEGATIVE
NITRITE URINE: NEGATIVE
PH URINE: 6.5
PROTEIN URINE: NEGATIVE
SPECIFIC GRAVITY URINE: 1.02
UROBILINOGEN URINE: NORMAL

## 2022-08-29 RX ORDER — CEPHALEXIN 500 MG/1
500 TABLET ORAL 3 TIMES DAILY
Qty: 21 | Refills: 0 | Status: COMPLETED | COMMUNITY
Start: 2022-01-03 | End: 2022-08-29

## 2022-11-14 ENCOUNTER — RESULT REVIEW (OUTPATIENT)
Age: 37
End: 2022-11-14

## 2022-11-14 ENCOUNTER — OUTPATIENT (OUTPATIENT)
Dept: OUTPATIENT SERVICES | Facility: HOSPITAL | Age: 37
LOS: 1 days | End: 2022-11-14
Payer: COMMERCIAL

## 2022-11-14 ENCOUNTER — APPOINTMENT (OUTPATIENT)
Dept: MAMMOGRAPHY | Facility: CLINIC | Age: 37
End: 2022-11-14

## 2022-11-14 ENCOUNTER — APPOINTMENT (OUTPATIENT)
Dept: ULTRASOUND IMAGING | Facility: CLINIC | Age: 37
End: 2022-11-14

## 2022-11-14 DIAGNOSIS — Z00.8 ENCOUNTER FOR OTHER GENERAL EXAMINATION: ICD-10-CM

## 2022-11-14 DIAGNOSIS — N63.0 UNSPECIFIED LUMP IN UNSPECIFIED BREAST: ICD-10-CM

## 2022-11-14 PROCEDURE — 77067 SCR MAMMO BI INCL CAD: CPT | Mod: 26

## 2022-11-14 PROCEDURE — 76641 ULTRASOUND BREAST COMPLETE: CPT

## 2022-11-14 PROCEDURE — 77063 BREAST TOMOSYNTHESIS BI: CPT

## 2022-11-14 PROCEDURE — 76641 ULTRASOUND BREAST COMPLETE: CPT | Mod: 26,50

## 2022-11-14 PROCEDURE — 77067 SCR MAMMO BI INCL CAD: CPT

## 2022-11-14 PROCEDURE — 77063 BREAST TOMOSYNTHESIS BI: CPT | Mod: 26

## 2022-11-15 ENCOUNTER — TRANSCRIPTION ENCOUNTER (OUTPATIENT)
Age: 37
End: 2022-11-15

## 2022-12-23 ENCOUNTER — APPOINTMENT (OUTPATIENT)
Dept: ORTHOPEDIC SURGERY | Facility: CLINIC | Age: 37
End: 2022-12-23

## 2022-12-23 VITALS — HEIGHT: 62 IN | BODY MASS INDEX: 25.76 KG/M2 | WEIGHT: 140 LBS

## 2022-12-23 PROCEDURE — 73564 X-RAY EXAM KNEE 4 OR MORE: CPT | Mod: LT

## 2022-12-23 PROCEDURE — 99203 OFFICE O/P NEW LOW 30 MIN: CPT

## 2022-12-23 NOTE — DISCUSSION/SUMMARY
[de-identified] : 37f with left knee pfs\par 1) start physical therapy\par 2) cryotherapy, rest and activity modification \par 3) discussed the availability of csi\par 4) rtc 4 weeks, if no improvement will consider eval with MRI\par \par Entered by Arabella Card acting as scribe.\par

## 2022-12-23 NOTE — PHYSICAL EXAM
[Left] : left knee [NL (140)] : flexion 140 degrees [NL (0)] : extension 0 degrees [Negative] : negative Marcie's [] : patient ambulates without assistive device

## 2022-12-23 NOTE — HISTORY OF PRESENT ILLNESS
[9] : 9 [3] : 3 [Dull/Aching] : dull/aching [Localized] : localized [Sharp] : sharp [Throbbing] : throbbing [Intermittent] : intermittent [Household chores] : household chores [Leisure] : leisure [Work] : work [Sleep] : sleep [Sexual activity] : sexual activity [Social interactions] : social interactions [Nothing helps with pain getting better] : Nothing helps with pain getting better [Standing] : standing [Walking] : walking [Exercising] : exercising [Stairs] : stairs [Full time] : Work status: full time [de-identified] : 12/23/2022 Ms. JOHANNA PARISI, a 37 year old female, presents today for left knee pain present for the past few months. Has noticed left knee pain most present while exercising at the gym. Has tried icing with minimal relief. Has seen some improvement since taking a rest from her exercises. Also has noticed a "clicking" over the left knee.  Describes her pain as being mostly located over the anterior aspect. Has experienced locking of the left knee.  [] : no

## 2023-01-24 ENCOUNTER — APPOINTMENT (OUTPATIENT)
Dept: INTERNAL MEDICINE | Facility: CLINIC | Age: 38
End: 2023-01-24

## 2023-01-27 ENCOUNTER — APPOINTMENT (OUTPATIENT)
Dept: ORTHOPEDIC SURGERY | Facility: CLINIC | Age: 38
End: 2023-01-27

## 2023-02-27 ENCOUNTER — APPOINTMENT (OUTPATIENT)
Dept: ORTHOPEDIC SURGERY | Facility: CLINIC | Age: 38
End: 2023-02-27
Payer: COMMERCIAL

## 2023-02-27 VITALS — HEIGHT: 62 IN | WEIGHT: 143 LBS | BODY MASS INDEX: 26.31 KG/M2

## 2023-02-27 DIAGNOSIS — M22.2X2 PATELLOFEMORAL DISORDERS, LEFT KNEE: ICD-10-CM

## 2023-02-27 PROCEDURE — 99213 OFFICE O/P EST LOW 20 MIN: CPT

## 2023-02-27 NOTE — HISTORY OF PRESENT ILLNESS
[5] : 5 [1] : 2 [Dull/Aching] : dull/aching [Localized] : localized [Sharp] : sharp [Throbbing] : throbbing [Intermittent] : intermittent [Household chores] : household chores [Leisure] : leisure [Work] : work [Sleep] : sleep [Sexual activity] : sexual activity [Social interactions] : social interactions [Nothing helps with pain getting better] : Nothing helps with pain getting better [Standing] : standing [Walking] : walking [Exercising] : exercising [Stairs] : stairs [Full time] : Work status: full time [de-identified] : 2/27/23: Here for fu L knee. She reports partial improvement in pain since last visit but she does still have limitations. She is in PT.\par \par 12/23/2022 Ms. JOHANNA PARISI, a 37 year old female, presents today for left knee pain present for the past few months. Has noticed left knee pain most present while exercising at the gym. Has tried icing with minimal relief. Has seen some improvement since taking a rest from her exercises. Also has noticed a "clicking" over the left knee.  Describes her pain as being mostly located over the anterior aspect. Has experienced locking of the left knee.  [] : no [FreeTextEntry1] : left leg [FreeTextEntry5] : JOHANNA PARISI  is a 37 year female who is here today to follow up on left leg pain, states she still has pain in the knee cap. Pain has been improving

## 2023-02-27 NOTE — DISCUSSION/SUMMARY
[de-identified] : 37f with left knee pfs, has not seen significant improvement with PT, rest, nsaids\par 1) indicated for MRI L knee to eval meniscal tear\par 2) fu for mri review\par \par

## 2023-02-27 NOTE — PHYSICAL EXAM
[Left] : left knee [NL (140)] : flexion 140 degrees [NL (0)] : extension 0 degrees [Negative] : negative Marcie's [] : no extensor lag

## 2023-03-01 ENCOUNTER — FORM ENCOUNTER (OUTPATIENT)
Age: 38
End: 2023-03-01

## 2023-03-02 ENCOUNTER — APPOINTMENT (OUTPATIENT)
Dept: MRI IMAGING | Facility: CLINIC | Age: 38
End: 2023-03-02
Payer: COMMERCIAL

## 2023-03-02 ENCOUNTER — APPOINTMENT (OUTPATIENT)
Dept: MRI IMAGING | Facility: CLINIC | Age: 38
End: 2023-03-02

## 2023-03-02 PROCEDURE — 73721 MRI JNT OF LWR EXTRE W/O DYE: CPT | Mod: LT

## 2023-03-13 ENCOUNTER — APPOINTMENT (OUTPATIENT)
Dept: ORTHOPEDIC SURGERY | Facility: CLINIC | Age: 38
End: 2023-03-13

## 2023-07-31 ENCOUNTER — APPOINTMENT (OUTPATIENT)
Dept: AFTER HOURS CARE | Facility: EMERGENCY ROOM | Age: 38
End: 2023-07-31
Payer: COMMERCIAL

## 2023-07-31 PROCEDURE — 99204 OFFICE O/P NEW MOD 45 MIN: CPT | Mod: 95

## 2023-08-28 ENCOUNTER — APPOINTMENT (OUTPATIENT)
Dept: INTERNAL MEDICINE | Facility: CLINIC | Age: 38
End: 2023-08-28
Payer: COMMERCIAL

## 2023-08-28 ENCOUNTER — LABORATORY RESULT (OUTPATIENT)
Age: 38
End: 2023-08-28

## 2023-08-28 VITALS
HEART RATE: 64 BPM | HEIGHT: 62 IN | OXYGEN SATURATION: 97 % | WEIGHT: 146 LBS | DIASTOLIC BLOOD PRESSURE: 75 MMHG | RESPIRATION RATE: 12 BRPM | TEMPERATURE: 97.5 F | BODY MASS INDEX: 26.87 KG/M2 | SYSTOLIC BLOOD PRESSURE: 127 MMHG

## 2023-08-28 DIAGNOSIS — E55.9 VITAMIN D DEFICIENCY, UNSPECIFIED: ICD-10-CM

## 2023-08-28 DIAGNOSIS — D64.9 ANEMIA, UNSPECIFIED: ICD-10-CM

## 2023-08-28 DIAGNOSIS — Z87.42 PERSONAL HISTORY OF OTHER DISEASES OF THE FEMALE GENITAL TRACT: ICD-10-CM

## 2023-08-28 DIAGNOSIS — N39.0 URINARY TRACT INFECTION, SITE NOT SPECIFIED: ICD-10-CM

## 2023-08-28 DIAGNOSIS — R01.1 CARDIAC MURMUR, UNSPECIFIED: ICD-10-CM

## 2023-08-28 DIAGNOSIS — Z00.00 ENCOUNTER FOR GENERAL ADULT MEDICAL EXAMINATION W/OUT ABNORMAL FINDINGS: ICD-10-CM

## 2023-08-28 PROCEDURE — 81025 URINE PREGNANCY TEST: CPT

## 2023-08-28 PROCEDURE — 99396 PREV VISIT EST AGE 40-64: CPT | Mod: 25

## 2023-08-28 RX ORDER — CHROMIUM 200 MCG
25 MCG TABLET ORAL
Refills: 0 | Status: ACTIVE | COMMUNITY

## 2023-08-28 RX ORDER — FLUCONAZOLE 150 MG/1
150 TABLET ORAL
Qty: 1 | Refills: 1 | Status: COMPLETED | COMMUNITY
Start: 2023-07-31 | End: 2023-08-28

## 2023-08-28 RX ORDER — FERROUS GLUCONATE 324(38)MG
324 (38 FE) TABLET ORAL DAILY
Qty: 1 | Refills: 3 | Status: COMPLETED | COMMUNITY
Start: 2022-08-25 | End: 2023-08-28

## 2023-08-28 RX ORDER — CIPROFLOXACIN HYDROCHLORIDE 250 MG/1
250 TABLET, FILM COATED ORAL
Qty: 14 | Refills: 0 | Status: COMPLETED | COMMUNITY
Start: 2022-08-29 | End: 2023-08-28

## 2023-08-28 RX ORDER — BUDESONIDE 32 UG/1
32 SPRAY, METERED NASAL DAILY
Qty: 3 | Refills: 2 | Status: COMPLETED | COMMUNITY
Start: 2021-05-18 | End: 2023-08-28

## 2023-08-28 RX ORDER — FLUCONAZOLE 150 MG/1
150 TABLET ORAL
Qty: 2 | Refills: 0 | Status: COMPLETED | COMMUNITY
Start: 2022-01-03 | End: 2023-08-28

## 2023-08-28 RX ORDER — PHENAZOPYRIDINE 200 MG/1
200 TABLET, FILM COATED ORAL 3 TIMES DAILY
Qty: 9 | Refills: 0 | Status: COMPLETED | COMMUNITY
Start: 2022-01-03 | End: 2023-08-28

## 2023-08-28 NOTE — ASSESSMENT
[FreeTextEntry1] : 1. health Maintenance: continue with physical activity and healthy diet. Ordered routine labs  She is up to date with vaccines  and having eye exam today and up to date with gyn  exams   2 bmi 26 Weight loss, exercise, and diet control were discussed and are highly encouraged. Treatment options were given such as, aqua therapy, and contacting a nutritionist. Recommended to use the elliptical, stationary bike, less use of treadmill. Mindful eating was explained to the patient Obesity is associated with worsening asthma, shortness of breath, and potential for cardiac disease, diabetes, and other underlying medical conditions. -Nutrition and lifestyle concepts reviewed in detail. Recommending an unprocessed diet with >= 50% of nutrients from whole plant sources; most food early in the day; most calories before 4 pm, and no food after 8 pm; advised starting with small sustainable changes and building up over time. The long-term plan for this type of dietary change was reviewed. A number of resources to help in initiating these changes were provided. -A particular emphasis was placed on the need to remove processed foods from the diet. The concept of insulin resistance was introduced as important for understanding effective weight loss measures. Educational handouts explaining these concepts were provided.  3. Breast nodules: pt with BI-RADS of 3 on prior mammogram/breast ultrasound, she is due for repeat imaging. Referral provided   monthly self breast exams    4. Amenorrhea: negative pregnancy test in the office. Ordered HCG test to evaluate. Also ordered CBC w/ iron studies given hx of EDWIN    5 anemia  check iron ferritin levels

## 2023-08-28 NOTE — HISTORY OF PRESENT ILLNESS
[FreeTextEntry1] : annual wellness visit [de-identified] : 38 yr old woman with hx of migraines, anemia, and heart murmur who is here for her cpe. Pt denies any new diagnosis or visits to the  emergency room. No new medications or supplements. Pt feels well overall, only concern at this moment is that pt is due for her menses yesterday, she is currently experiencing some cramping. She missed her oral contraceptive pill last month.  Pt  denies any HA at this time, only symptomatic when her periods come. Denies fatigue, SOB, dizziness, CP, palpitations.

## 2023-08-28 NOTE — COUNSELING
[Fall prevention counseling provided] : Fall prevention counseling provided [Adequate lighting] : Adequate lighting [No throw rugs] : No throw rugs [Behavioral health counseling provided] : Behavioral health counseling provided [Sleep ___ hours/day] : Sleep [unfilled] hours/day [No] : Risk of tobacco use and health benefits of smoking cessation discussed: No [None] : None [Good understanding] : Patient has a good understanding of lifestyle changes and steps needed to achieve self management goal [Potential consequences of obesity discussed] : Potential consequences of obesity discussed [Benefits of weight loss discussed] : Benefits of weight loss discussed [Structured Weight Management Program suggested:] : Structured weight management program suggested [Encouraged to maintain food diary] : Encouraged to maintain food diary [Encouraged to increase physical activity] : Encouraged to increase physical activity [Target Wt Loss Goal ___] : Weight Loss Goals: Target weight loss goal [unfilled] lbs [Weigh Self Weekly] : weigh self weekly [Decrease Portions] : decrease portions [____ min/wk Activity] : [unfilled] min/wk activity [Keep Food Diary] : keep food diary [FreeTextEntry1] : low brenda [FreeTextEntry2] : healthy diet  [id number]  [id number] she is  89  bmi 26 [de-identified] : continue with physical activity and healthy diet

## 2023-08-28 NOTE — HEALTH RISK ASSESSMENT
[Very Good] : ~his/her~  mood as very good [No] : In the past 12 months have you used drugs other than those required for medical reasons? No [No falls in past year] : Patient reported no falls in the past year [0] : 2) Feeling down, depressed, or hopeless: Not at all (0) [PHQ-2 Negative - No further assessment needed] : PHQ-2 Negative - No further assessment needed [Patient reported mammogram was abnormal] : Patient reported mammogram was abnormal [Patient reported PAP Smear was normal] : Patient reported PAP Smear was normal [HIV test declined] : HIV test declined [Hepatitis C test declined] : Hepatitis C test declined [None] : None [With Family] : lives with family [# of Members in Household ___] :  household currently consist of [unfilled] member(s) [Employed] : employed [] :  [# Of Children ___] : has [unfilled] children [Sexually Active] : sexually active [Feels Safe at Home] : Feels safe at home [Fully functional (bathing, dressing, toileting, transferring, walking, feeding)] : Fully functional (bathing, dressing, toileting, transferring, walking, feeding) [Fully functional (using the telephone, shopping, preparing meals, housekeeping, doing laundry, using] : Fully functional and needs no help or supervision to perform IADLs (using the telephone, shopping, preparing meals, housekeeping, doing laundry, using transportation, managing medications and managing finances) [Smoke Detector] : smoke detector [Carbon Monoxide Detector] : carbon monoxide detector [Guns at Home] : guns at home [Seat Belt] :  uses seat belt [Sunscreen] : uses sunscreen [Travel to Developing Areas] : travel to developing areas [Designated Healthcare Proxy] : Designated healthcare proxy [Name: ___] : Health Care Proxy's Name: [unfilled]  [Relationship: ___] : Relationship: [unfilled] [Time Spent: ___ minutes] : Time Spent: [unfilled] minutes [Never] : Never [FreeTextEntry1] : denies [de-identified] : Gynecologist last seen in 2/23 [de-identified] :  goes to gym 2-3 times a week, engages in cardio and resistance training [de-identified] : healthy diet, has been tryting to gain weight.  [[id number]] : 0 [Change in mental status noted] : No change in mental status noted [Language] : denies difficulty with language [Behavior] : denies difficulty with behavior [Learning/Retaining New Information] : denies difficulty learning/retaining new information [Handling Complex Tasks] : denies difficulty handling complex tasks [Reasoning] : denies difficulty with reasoning [Spatial Ability and Orientation] : denies difficulty with spatial ability and orientation [Reports changes in hearing] : Reports no changes in hearing [Reports changes in vision] : Reports no changes in vision [Reports changes in dental health] : Reports no changes in dental health [TB Exposure] : is not being exposed to tuberculosis [MammogramDate] : 11/22 [MammogramComments] : BIRADS 3Multiple stable bilateral hypoechoic masses. Stable short-term follow-up of left axillary 5 mm mass initially described 8.2022. [PapSmearDate] : 02/23 [PapSmearComments] : no report available.  Reported as normal by the pt  [FreeTextEntry2] :   [de-identified] : pt scheduled for eye exam today  [AdvancecareDate] : 8/28/23

## 2023-08-28 NOTE — PHYSICAL EXAM
[Well Developed] : well developed [Well Nourished] : well nourished [Healthy] : healthy appearing [Normal Voice Quality] : was normal [Normal Verbal Skills] : the patient had normal verbal communication skills [Conjunctiva] : the conjunctiva were normal in both eyes [] : The left pupil was normal [Pupil Nonreactive To Light - Direct, Right Only] : reacts to light [Pupil Nonreactive To Light - Direct, Left Only] : reacts to light [Normal Outer Ear/Nose] : the outer ears and nose were normal in appearance [Normal Oropharynx] : the oropharynx was normal [No JVD] : no jugular venous distention [No Lymphadenopathy] : no lymphadenopathy [Supple] : supple [Thyroid Normal, No Nodules] : the thyroid was normal and there were no nodules present [Rate ___] : at [unfilled] breaths per minute [Normal Rhythm/Effort] : normal respiratory rhythm and effort [Clear Bilaterally] : the lungs were clear to auscultation bilaterally [Rales / Crackles Bilateral] : no rales or crackles were heard [Wheezing Bilaterally] : no wheezing was heard [Normal Rate] : normal [Heart Rate ___] : [unfilled] bpm [Rhythm Regular] : regular [Normal S1] : normal S1 [Normal S2] : normal S2 [II] : a grade 2 [No Abnormalities] : the abdominal aorta was not enlarged and no bruit was heard [Examination Of The Breasts] : a normal appearance [No CVA Tenderness] : no CVA  tenderness [No Spinal Tenderness] : no spinal tenderness [No Joint Swelling] : no joint swelling [Grossly Normal Strength/Tone] : grossly normal strength/tone [Normal Scalp] : inspection of the scalp showed no abnormalities [Examination Of The Hair] : texture and distribution of hair was normal [Multiple Tattoos] : multiple tattoos observed [Coordination Grossly Intact] : coordination grossly intact [No Focal Deficits] : no focal deficits [Normal Gait] : normal gait [Normal Affect] : the affect was normal [Normal Mood] : the mood was normal [Normal Insight/Judgement] : insight and judgment were intact [EOM Intact] : extraocular movements were intact [5th Left ICS - MCL] : palpated at the 5th LICS in the midclavicular line [S3] : no S3 [S4] : no S4 [Rt] : no varicose veins of the right leg [Lt] : no varicose veins of the left leg [Right Carotid Bruit] : no bruit heard over the right carotid [Left Carotid Bruit] : no bruit heard over the left carotid [Right Femoral Bruit] : no bruit heard over the right femoral artery [Left Femoral Bruit] : no bruit heard over the left femoral artery [2+] : left 2+ [Bruit] : no bruit heard [No Discharge] : no discharge [Soft, Nontender] : the abdomen was soft and nontender [No Mass] : no masses were palpated [No HSM] : no hepatosplenomegaly noted [None] : no CVA tenderness [Postauricular Lymph Nodes Enlarged Bilaterally] : nodes not enlarged [Preauricular Lymph Nodes Enlarged Bilaterally] : nodes not enlarged [Submandibular Lymph Nodes Enlarged Bilaterally] : nodes not enlarged [Suboccipital Lymph Nodes Enlarged Bilaterally] : nodes not enlarged [Submental Lymph Nodes Enlarged] : nodes not enlarged [Cervical Lymph Nodes Enlarged Posterior Bilaterally] : nodes not enlarged [Cervical Lymph Nodes Enlarged Anterior Bilaterally] : nodes not enlarged [Supraclavicular Lymph Nodes Enlarged Bilaterally] : nodes not enlarged [Axillary Lymph Nodes Enlarged Bilaterally] : nodes not enlarged [Epitrochlear Lymph Nodes Enlarged Bilaterally] : nodes not enlarged [Femoral Lymph Nodes Enlarged Bilaterally] : nodes not enlarged [Inguinal Lymph Nodes Enlarged Bilaterally] : nodes not enlarged [No Lymphangitis] : no lymphangitis observed [Normal Kyphosis] : normal kyphosis [No Visual Abnormalities] : no visible abnormalities [Normal Lordosis] : normal lordosis [No Scoliosis] : no scoliosis [No Tenderness to Palpation] : no spine tenderness on palpation [No Masses] : no masses [Full ROM] : full ROM [No Pain with ROM] : no pain with motion in any direction [Intact] : all reflexes within normal limits bilaterally [Normal Station and Gait] : the gait and station were normal [Normal Motor Tone] : the muscle tone was normal [Involuntary Movements] : no involuntary movements were seen [Complexion Medium] : medium complexion [Umbilicus] : the umbilicus [Both Nipples Pierced For Ornamentation] : both nipples [Both Ears Pierced] : both ears [Normal] : the deep tendon reflexes were normal [Normal Mental Status] : the patient's orientation, memory, attention, language and fund of knowledge were normal [Appropriate] : appropriate [Impaired judgment] : intact judgment [Impaired Insight] : intact insight

## 2023-08-29 LAB
ALBUMIN SERPL ELPH-MCNC: 4.3 G/DL
ALP BLD-CCNC: 103 U/L
ALT SERPL-CCNC: 20 U/L
ANION GAP SERPL CALC-SCNC: 12 MMOL/L
APPEARANCE: CLEAR
AST SERPL-CCNC: 18 U/L
BILIRUB SERPL-MCNC: 0.2 MG/DL
BILIRUBIN URINE: NEGATIVE
BLOOD URINE: ABNORMAL
BUN SERPL-MCNC: 11 MG/DL
CALCIUM SERPL-MCNC: 9.1 MG/DL
CHLORIDE SERPL-SCNC: 103 MMOL/L
CHOLEST SERPL-MCNC: 185 MG/DL
CO2 SERPL-SCNC: 24 MMOL/L
COLOR: YELLOW
CREAT SERPL-MCNC: 0.76 MG/DL
EGFR: 102 ML/MIN/1.73M2
FERRITIN SERPL-MCNC: 35 NG/ML
GLUCOSE QUALITATIVE U: NEGATIVE MG/DL
GLUCOSE SERPL-MCNC: 92 MG/DL
HCG SERPL-MCNC: <1 MIU/ML
HDLC SERPL-MCNC: 56 MG/DL
IRON SATN MFR SERPL: 20 %
IRON SERPL-MCNC: 72 UG/DL
KETONES URINE: NEGATIVE MG/DL
LDLC SERPL CALC-MCNC: 103 MG/DL
LEUKOCYTE ESTERASE URINE: NEGATIVE
NITRITE URINE: NEGATIVE
NONHDLC SERPL-MCNC: 129 MG/DL
PH URINE: 6
POTASSIUM SERPL-SCNC: 4.1 MMOL/L
PROT SERPL-MCNC: 7.4 G/DL
PROTEIN URINE: NEGATIVE MG/DL
SODIUM SERPL-SCNC: 139 MMOL/L
SPECIFIC GRAVITY URINE: 1.03
TIBC SERPL-MCNC: 371 UG/DL
TRIGL SERPL-MCNC: 150 MG/DL
UIBC SERPL-MCNC: 298 UG/DL
UROBILINOGEN URINE: 0.2 MG/DL

## 2023-09-04 LAB
M TB IFN-G BLD-IMP: NEGATIVE
QUANTIFERON TB PLUS MITOGEN MINUS NIL: 1.99 IU/ML
QUANTIFERON TB PLUS NIL: 0.05 IU/ML
QUANTIFERON TB PLUS TB1 MINUS NIL: 0.03 IU/ML
QUANTIFERON TB PLUS TB2 MINUS NIL: 0.01 IU/ML

## 2023-11-13 ENCOUNTER — APPOINTMENT (OUTPATIENT)
Dept: MAMMOGRAPHY | Facility: CLINIC | Age: 38
End: 2023-11-13
Payer: COMMERCIAL

## 2023-11-13 ENCOUNTER — APPOINTMENT (OUTPATIENT)
Dept: ULTRASOUND IMAGING | Facility: CLINIC | Age: 38
End: 2023-11-13
Payer: COMMERCIAL

## 2023-11-13 ENCOUNTER — RESULT REVIEW (OUTPATIENT)
Age: 38
End: 2023-11-13

## 2023-11-13 ENCOUNTER — OUTPATIENT (OUTPATIENT)
Dept: OUTPATIENT SERVICES | Facility: HOSPITAL | Age: 38
LOS: 1 days | End: 2023-11-13
Payer: COMMERCIAL

## 2023-11-13 DIAGNOSIS — Z00.8 ENCOUNTER FOR OTHER GENERAL EXAMINATION: ICD-10-CM

## 2023-11-13 PROCEDURE — 77063 BREAST TOMOSYNTHESIS BI: CPT

## 2023-11-13 PROCEDURE — 77067 SCR MAMMO BI INCL CAD: CPT

## 2023-11-13 PROCEDURE — 77067 SCR MAMMO BI INCL CAD: CPT | Mod: 26

## 2023-11-13 PROCEDURE — 76641 ULTRASOUND BREAST COMPLETE: CPT

## 2023-11-13 PROCEDURE — 77063 BREAST TOMOSYNTHESIS BI: CPT | Mod: 26

## 2023-11-13 PROCEDURE — 76641 ULTRASOUND BREAST COMPLETE: CPT | Mod: 26,50

## 2023-11-14 ENCOUNTER — APPOINTMENT (OUTPATIENT)
Dept: INTERNAL MEDICINE | Facility: CLINIC | Age: 38
End: 2023-11-14
Payer: COMMERCIAL

## 2023-11-14 DIAGNOSIS — R92.8 OTHER ABNORMAL AND INCONCLUSIVE FINDINGS ON DIAGNOSTIC IMAGING OF BREAST: ICD-10-CM

## 2023-11-14 PROCEDURE — 99442: CPT

## 2023-11-21 ENCOUNTER — APPOINTMENT (OUTPATIENT)
Dept: SURGICAL ONCOLOGY | Facility: CLINIC | Age: 38
End: 2023-11-21
Payer: COMMERCIAL

## 2023-11-21 VITALS
DIASTOLIC BLOOD PRESSURE: 80 MMHG | WEIGHT: 146 LBS | TEMPERATURE: 97.4 F | HEIGHT: 62 IN | HEART RATE: 67 BPM | OXYGEN SATURATION: 96 % | RESPIRATION RATE: 16 BRPM | SYSTOLIC BLOOD PRESSURE: 118 MMHG | BODY MASS INDEX: 26.87 KG/M2

## 2023-11-21 DIAGNOSIS — R92.2 INCONCLUSIVE MAMMOGRAM: ICD-10-CM

## 2023-11-21 DIAGNOSIS — R92.30 INCONCLUSIVE MAMMOGRAM: ICD-10-CM

## 2023-11-21 PROCEDURE — 99205 OFFICE O/P NEW HI 60 MIN: CPT

## 2024-01-08 ENCOUNTER — APPOINTMENT (OUTPATIENT)
Dept: INTERNAL MEDICINE | Facility: CLINIC | Age: 39
End: 2024-01-08
Payer: COMMERCIAL

## 2024-01-08 VITALS
HEART RATE: 81 BPM | SYSTOLIC BLOOD PRESSURE: 116 MMHG | DIASTOLIC BLOOD PRESSURE: 67 MMHG | HEIGHT: 62 IN | OXYGEN SATURATION: 98 % | WEIGHT: 141.31 LBS | TEMPERATURE: 97.3 F | BODY MASS INDEX: 26.01 KG/M2

## 2024-01-08 DIAGNOSIS — J30.89 OTHER ALLERGIC RHINITIS: ICD-10-CM

## 2024-01-08 DIAGNOSIS — N63.0 UNSPECIFIED LUMP IN UNSPECIFIED BREAST: ICD-10-CM

## 2024-01-08 DIAGNOSIS — Z87.42 PERSONAL HISTORY OF OTHER DISEASES OF THE FEMALE GENITAL TRACT: ICD-10-CM

## 2024-01-08 PROCEDURE — 99213 OFFICE O/P EST LOW 20 MIN: CPT | Mod: 25

## 2024-01-08 NOTE — ASSESSMENT
[FreeTextEntry1] : 1 breast cancer surveillance  - continue monthly self examination  fu regualry with breast surgeon .   2  bmi 25   Weight loss, exercise, and diet control were discussed and are highly encouraged. Treatment options were given such as, aqua therapy, and contacting a nutritionist. Recommended to use the elliptical, stationary bike, less use of treadmill. Mindful eating was explained to the patient Obesity is associated with worsening asthma, shortness of breath, and potential for cardiac disease, diabetes, and other underlying medical conditions. .3. allergies controlled  Once a person's trigger(s) have been identified, the next step is to reduce exposure to those specific allergens. Triggers may be present at work or at home, although for most people, the home environment is the primary source. It is especially important to reduce exposure to triggers in the bedroom because most people spend a significant number of hours there. However, to be effective, changes must be made throughout the entire home Dust mites  Dust mites are a microscopic type of insect that live in bedding, sofas, carpets, or any woven material. Dust mites do not bite and do not cause harm to humans, other than by triggering allergies. Mites absorb humidity from the atmosphere (ie, they do not drink) and feed on organic matter (including shed human and animal skin). They require sufficient humidity and nests to live in (which are not visible with the naked eye). Dust mite infestation is less common in dry climates, such as the [place]. Exposure to dust mites can be reduced by encasing pillows, mattresses, box springs, comforters, and furniture in mite-impermeable barriers. When covering crib mattresses and children's mattresses and pillows, only tight-fitting, commercial covers intended for this purpose should be used. Homemade covers (for example, plastic sheeting fastened with duct tape) should not be used in children's beds, as these can come apart, and children can become trapped or suffocate. Tightly-woven fabrics with a pore size of 6 microns or less are very effective at controlling the passage of mite as well as cat allergens. Fabrics with a pore size greater than 2 microns still permit airflow Mites can be eliminated by washing sheets and blankets weekly in warm water with detergent or by drying them in an electric dryer on the hot setting Exposure can be further reduced by vacuuming with a vacuum  equipped with a high-efficiency particulate air (HEPA) filter, dusting regularly, and not sleeping on upholstered furniture (eg, couches). However, studies have yet to show that physical or chemical cleaning methods reduce mite levels to a degree that improves symptoms Indoor humidity levels should be kept between 30 and 50 percent. Inexpensive humidity monitors can be purchased at most hardware stores. Humidifiers make the problem worse and are not recommended. When possible, the amount of clutter, carpet, upholstered furniture, and drapes should be minimized, and horizontal blinds should be eliminated in the rooms where the person spends the most time (bedroom, study, television room). Washable vinyl, roller-type shades are optimal. For children, the number of stuffed toys in the bedroom should be minimized. Animal dander  Animal dander is made up of the dead skin cells or scales (like dandruff) that are constantly shed by animals. Any breed of dog or cat is capable of being allergenic, although the levels given off by individual animals may vary to some degree. In cats, the protein that causes most people's allergies is found in the cat's saliva, skin glands, and urinary/reproductive tract. Accordingly, short-haired cats are not necessarily less allergenic than long-haired animals, and furless cats have allergen levels similar to furred cats. Other animals, such as rodents, birds, and ferrets, can also trigger symptoms in an allergic individual. Pets without feathers or fur, such as reptiles, turtles, and fish, rarely cause allergy, although deposits of fish food that build up under the covers of fish tanks are an excellent source of food for dust mite colonies. If a person is found to be allergic to a pet, the most effective option is to remove the pet from the home. Limiting an animal to a certain area in the house is not effective, because allergens are carried on clothing or spread in the air. Once a pet has left a home, careful cleaning (or removal) of carpets, sofas, curtains, and bedding must follow. This is particularly true for cat allergens because they are "sticky" and adhere to a variety of indoor surfaces. Even after a cat has been removed from a home and it has been thoroughly cleaned, it can take months for the level of cat allergen to drop. For this reason, it may take months for the person's symptoms to fully reflect the absence of the pet. If it is not possible to remove the animal, measures can be taken to decrease exposure to the animal dander (although none of these methods are as effective as removing the animal. Vacuum  with a HEPA filter are effective in reducing cat and dog allergen levels in the home and can reduce symptoms Rodents  Mice and rats have proteins in their urine that can cause allergies. This applies to rodents that live in a laboratory setting, as well as rodents that live in the wild. To reduce rodent allergen levels significantly, a combination of pest control methods, in addition to pesticides (eg, poison baits), are usually necessary. This includes keeping food and trash in covered containers, cleaning food scraps from the floor and countertops, and sealing cracks in the walls, doors, and floors. Cockroaches  Cockroach droppings contain allergens that can trigger asthma and allergic rhinitis in sensitive individuals. Cockroaches thrive in warm, moist environments with easily accessible food and water. Unfortunately, efforts to control cockroach populations in infested areas are often less than successful. Still, certain measures are recommended: ?Use multiple baited traps or poisons ?Remove garbage and food waste promptly from the home ?Wash dishes and cooking utensils immediately after use ?Remove cockroach debris quickly ?Eliminate any standing water from leaking faucets or drains ?Keep humidity levels less than 50 percent with a dehumidifier or air conditioner ?Consult a professional  for large or recurrent infestations Asian ladybugs  Asian ladybugs were previously imported to the [place] as a biologic means of controlling aphids. It was anticipated that the insects would not survive the cold of winter. However, they adapted by moving inside houses when temperatures drop in the early fall. Allergies to Asian ladybugs have been increasingly reported as a source of seasonal indoor respiratory symptoms, particularly chronic cough, rhinitis, and asthma. Most cases have been reported in rural areas of the central, [place]western, and [place]. The insects can also bite and cause local reactions.  ladybugs enter homes through external cracks and crevices and then infest spaces within walls. They secrete a brown liquid that may stain walls and produce an unpleasant smell. Treating the exterior of the house with a chemical (pyrethroids) before cold weather arrives can prevent swarming of the ladybugs inside the home. Pyrethroids are similar to pyrethrins, which are derived from marigold flowers. Pyrethrins are broken down by the sun and do not significantly affect groundwater quality. Indoor molds  Mold spores can trigger symptoms of allergic rhinitis in allergic patients. Mold thrives in damp environments. Areas, such as air conditioning vents, water traps, refrigerator drip trays, shower stalls, leaky sinks, and damp basements, are particularly vulnerable to mold growth if not cleaned regularly. Most of the mold spores enter the home from the outside air. However, under certain circums

## 2024-01-08 NOTE — HISTORY OF PRESENT ILLNESS
[FreeTextEntry1] : fu [de-identified] : Pt  has seen Dr Jones and to fu  but pt feels she wants further evaluation and has an appt with [facility]  . She is concerned because her aunt  had breast cancer at age 40 yrs and  at 50 . She states her mother is clear so far and is 62 yrs old. She -denies any headaches, nausea, vomiting, fever, chills, sweats, chest pain, chest pressure, diarrhea, constipation, dysphagia, sour taste in the mouth, dizziness, leg swelling, leg pain, myalgias, arthralgias, itchy eyes, itchy ears, heartburn, or reflux. We discussed regular fu of breast and gertrude examination.

## 2024-01-08 NOTE — HEALTH RISK ASSESSMENT
[No] : In the past 12 months have you used drugs other than those required for medical reasons? No [No falls in past year] : Patient reported no falls in the past year [Little interest or pleasure doing things] : 1) Little interest or pleasure doing things [Feeling down, depressed, or hopeless] : 2) Feeling down, depressed, or hopeless [0] : 2) Feeling down, depressed, or hopeless: Not at all (0) [PHQ-2 Negative - No further assessment needed] : PHQ-2 Negative - No further assessment needed [Never] : Never [de-identified] : Dr Jones surg onc [de-identified] : goes to gym three times a week  [de-identified] : reg  [[id number]] : 0

## 2024-01-08 NOTE — COUNSELING
[Fall prevention counseling provided] : Fall prevention counseling provided [Adequate lighting] : Adequate lighting [No throw rugs] : No throw rugs [Sleep ___ hours/day] : Sleep [unfilled] hours/day [Engage in a relaxing activity] : Engage in a relaxing activity [Plan in advance] : Plan in advance [Potential consequences of obesity discussed] : Potential consequences of obesity discussed [Benefits of weight loss discussed] : Benefits of weight loss discussed [Structured Weight Management Program suggested:] : Structured weight management program suggested [Encouraged to maintain food diary] : Encouraged to maintain food diary [Encouraged to increase physical activity] : Encouraged to increase physical activity [Encouraged to use exercise tracking device] : Encouraged to use exercise tracking device [Weigh Self Weekly] : weigh self weekly [Decrease Portions] : decrease portions [____ min/wk Activity] : [unfilled] min/wk activity [Keep Food Diary] : keep food diary [FreeTextEntry2] : bmi 25

## 2024-01-08 NOTE — PHYSICAL EXAM
[No Acute Distress] : no acute distress [Normal Voice/Communication] : normal voice/communication [Normal Sclera/Conjunctiva] : normal sclera/conjunctiva [PERRL] : pupils equal round and reactive to light [EOMI] : extraocular movements intact [Normal Oropharynx] : the oropharynx was normal [No JVD] : no jugular venous distention [Supple] : supple [Normal Rate] : normal rate  [Regular Rhythm] : with a regular rhythm [Normal S1, S2] : normal S1 and S2 [No Edema] : there was no peripheral edema [No Extremity Clubbing/Cyanosis] : no extremity clubbing/cyanosis [Normal Posterior Cervical Nodes] : no posterior cervical lymphadenopathy [Normal Anterior Cervical Nodes] : no anterior cervical lymphadenopathy [Normal] : affect was normal and insight and judgment were intact

## 2024-08-29 ENCOUNTER — LABORATORY RESULT (OUTPATIENT)
Age: 39
End: 2024-08-29

## 2024-08-29 ENCOUNTER — APPOINTMENT (OUTPATIENT)
Dept: INTERNAL MEDICINE | Facility: CLINIC | Age: 39
End: 2024-08-29
Payer: COMMERCIAL

## 2024-08-29 VITALS
BODY MASS INDEX: 26.87 KG/M2 | HEART RATE: 67 BPM | HEIGHT: 62 IN | TEMPERATURE: 95.8 F | OXYGEN SATURATION: 98 % | WEIGHT: 146 LBS | DIASTOLIC BLOOD PRESSURE: 75 MMHG | SYSTOLIC BLOOD PRESSURE: 109 MMHG

## 2024-08-29 DIAGNOSIS — D72.829 ELEVATED WHITE BLOOD CELL COUNT, UNSPECIFIED: ICD-10-CM

## 2024-08-29 DIAGNOSIS — R92.30 DENSE BREASTS, UNSPECIFIED: ICD-10-CM

## 2024-08-29 DIAGNOSIS — J30.89 OTHER ALLERGIC RHINITIS: ICD-10-CM

## 2024-08-29 DIAGNOSIS — Z00.00 ENCOUNTER FOR GENERAL ADULT MEDICAL EXAMINATION W/OUT ABNORMAL FINDINGS: ICD-10-CM

## 2024-08-29 LAB
ALBUMIN SERPL ELPH-MCNC: 4.4 G/DL
ALP BLD-CCNC: 92 U/L
ALT SERPL-CCNC: 16 U/L
ANION GAP SERPL CALC-SCNC: 11 MMOL/L
AST SERPL-CCNC: 13 U/L
BASOPHILS # BLD AUTO: 0.03 K/UL
BASOPHILS NFR BLD AUTO: 0.3 %
BILIRUB SERPL-MCNC: 0.2 MG/DL
BUN SERPL-MCNC: 12 MG/DL
CALCIUM SERPL-MCNC: 9 MG/DL
CHLORIDE SERPL-SCNC: 102 MMOL/L
CHOLEST SERPL-MCNC: 179 MG/DL
CO2 SERPL-SCNC: 25 MMOL/L
CREAT SERPL-MCNC: 0.81 MG/DL
EGFR: 93 ML/MIN/1.73M2
EOSINOPHIL # BLD AUTO: 0.04 K/UL
EOSINOPHIL NFR BLD AUTO: 0.3 %
GLUCOSE SERPL-MCNC: 90 MG/DL
HCT VFR BLD CALC: 36.2 %
HDLC SERPL-MCNC: 74 MG/DL
HGB BLD-MCNC: 11.7 G/DL
IMM GRANULOCYTES NFR BLD AUTO: 0.4 %
IRON SATN MFR SERPL: 11 %
IRON SERPL-MCNC: 53 UG/DL
LDLC SERPL CALC-MCNC: 90 MG/DL
LYMPHOCYTES # BLD AUTO: 3.03 K/UL
LYMPHOCYTES NFR BLD AUTO: 25.8 %
MAN DIFF?: NORMAL
MCHC RBC-ENTMCNC: 28.2 PG
MCHC RBC-ENTMCNC: 32.3 GM/DL
MCV RBC AUTO: 87.2 FL
MONOCYTES # BLD AUTO: 0.56 K/UL
MONOCYTES NFR BLD AUTO: 4.8 %
NEUTROPHILS # BLD AUTO: 8.04 K/UL
NEUTROPHILS NFR BLD AUTO: 68.4 %
NONHDLC SERPL-MCNC: 105 MG/DL
PLATELET # BLD AUTO: 347 K/UL
POTASSIUM SERPL-SCNC: 4.4 MMOL/L
PROT SERPL-MCNC: 7.5 G/DL
RBC # BLD: 4.15 M/UL
RBC # FLD: 13.5 %
SODIUM SERPL-SCNC: 138 MMOL/L
TIBC SERPL-MCNC: 477 UG/DL
TRIGL SERPL-MCNC: 81 MG/DL
UIBC SERPL-MCNC: 423 UG/DL
WBC # FLD AUTO: 11.75 K/UL

## 2024-08-29 PROCEDURE — 99396 PREV VISIT EST AGE 40-64: CPT

## 2024-08-29 NOTE — PHYSICAL EXAM
[Conjunctiva] : the conjunctiva were normal in both eyes [PERRL] : pupils were equal in size, round, and reactive to light [EOM Intact] : extraocular movements were intact [Normal Appearance] : was normal in appearance [Neck Supple] : was supple [Enlarged Diffusely] : was not enlarged [Rate ___] : at [unfilled] breaths per minute [Normal Rhythm/Effort] : normal respiratory rhythm and effort [Clear Bilaterally] : the lungs were clear to auscultation bilaterally [Normal to Percussion] : the lungs were normal to percussion [5th Left ICS - MCL] : palpated at the 5th LICS in the midclavicular line [Heart Rate ___] : [unfilled] bpm [Rhythm Regular] : regular [Normal Rate] : normal [Normal S1] : normal S1 [Normal S2] : normal S2 [S3] : no S3 [S4] : no S4 [I] : a grade 1 [No Pitting Edema] : no pitting edema present [Rt] : no varicose veins of the right leg [Lt] : no varicose veins of the left leg [Right Carotid Bruit] : no bruit heard over the right carotid [Left Carotid Bruit] : no bruit heard over the left carotid [Right Femoral Bruit] : no bruit heard over the right femoral artery [Left Femoral Bruit] : no bruit heard over the left femoral artery [2+] : left 2+ [No Abnormalities] : the abdominal aorta was not enlarged and no bruit was heard [Bruit] : no bruit heard [Examination Of The Breasts] : a normal appearance [No Discharge] : no discharge [Soft, Nontender] : the abdomen was soft and nontender [No Mass] : no masses were palpated [No HSM] : no hepatosplenomegaly noted [Postauricular Lymph Nodes Enlarged Bilaterally] : nodes not enlarged [Preauricular Lymph Nodes Enlarged Bilaterally] : nodes not enlarged [Submandibular Lymph Nodes Enlarged Bilaterally] : nodes not enlarged [Suboccipital Lymph Nodes Enlarged Bilaterally] : nodes not enlarged [Cervical Lymph Nodes Enlarged Posterior Bilaterally] : nodes not enlarged [Submental Lymph Nodes Enlarged] : nodes not enlarged [Cervical Lymph Nodes Enlarged Anterior Bilaterally] : nodes not enlarged [Supraclavicular Lymph Nodes Enlarged Bilaterally] : nodes not enlarged [Axillary Lymph Nodes Enlarged Bilaterally] : nodes not enlarged [Epitrochlear Lymph Nodes Enlarged Bilaterally] : nodes not enlarged [Femoral Lymph Nodes Enlarged Bilaterally] : nodes not enlarged [Inguinal Lymph Nodes Enlarged Bilaterally] : nodes not enlarged [No Lymphangitis] : no lymphangitis observed [Normal Kyphosis] : normal kyphosis [No Visual Abnormalities] : no visible abnormalities [Normal Lordosis] : normal lordosis [No Scoliosis] : no scoliosis [No Tenderness to Palpation] : no spine tenderness on palpation [No Masses] : no masses [Full ROM] : full ROM [No Pain with ROM] : no pain with motion in any direction [Intact] : all reflexes within normal limits bilaterally [Normal Station and Gait] : the gait and station were normal [Normal Motor Tone] : the muscle tone was normal [Involuntary Movements] : no involuntary movements were seen [Normal Scalp] : inspection of the scalp showed no abnormalities [Examination Of The Hair] : texture and distribution of hair was normal [Complexion Medium] : medium complexion [Umbilicus] : the umbilicus [Both Nipples Pierced For Ornamentation] : both nipples [Both Ears Pierced] : both ears [Tattoo - Single] : a single tattoo was observed [Normal] : the deep tendon reflexes were normal [Normal Mental Status] : the patient's orientation, memory, attention, language and fund of knowledge were normal [Appropriate] : appropriate [Impaired judgment] : intact judgment [Impaired Insight] : intact insight [de-identified] : tongue normal 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

## 2024-08-29 NOTE — HISTORY OF PRESENT ILLNESS
[FreeTextEntry1] : cpe  [de-identified] : Pt is a 39 yr old woman with hx of  migraines, anemia, and heart murmur who is here for her cpe  Pt states she saw Shakir  and bandar one yr  stable nodules  and then went to [facility] [place] and told it was stable. She was concerned about the cancer risk noted on the  mammogram  which went form 11-13  to over 20 %   without any changes  that were new  .  She -denies any headaches, nausea, vomiting, fever, chills, sweats, chest pain, chest pressure, diarrhea, constipation, dysphagia, sour taste in the mouth, dizziness, leg swelling, leg pain, myalgias, arthralgias, itchy eyes, itchy ears, heartburn, or reflux.

## 2024-08-29 NOTE — ASSESSMENT
[FreeTextEntry1] : health  She is up to date with mammogram and dental needs eye exam up to date with gyn  2 bmi 26  Weight loss, exercise, and diet control were discussed and are highly encouraged. Treatment options were given such as, aqua therapy, and contacting a nutritionist. Recommended to use the elliptical, stationary bike, less use of treadmill. Mindful eating was explained to the patient Obesity is associated with worsening asthma, shortness of breath, and potential for cardiac disease, diabetes, and other underlying medical conditions. -Nutrition and lifestyle concepts reviewed in detail. Recommending an unprocessed diet with >= 50% of nutrients from whole plant sources; most food early in the day; most calories before 4 pm, and no food after 8 pm; advised starting with small sustainable changes and building up over time. The long-term plan for this type of dietary change was reviewed. A number of resources to help in initiating these changes were provided. -A particular emphasis was placed on the need to remove processed foods from the diet. The concept of insulin resistance was introduced as important for understanding effective weight loss measures. Educational handouts explaining these concepts were provided. 3. dense breasts  she has nipple rings and will remove to have mri of breasts in Nov. 4 allergies  Once a person's trigger(s) have been identified, the next step is to reduce exposure to those specific allergens. Triggers may be present at work or at home, although for most people, the home environment is the primary source. It is especially important to reduce exposure to triggers in the bedroom because most people spend a significant number of hours there. However, to be effective, changes must be made throughout the entire home Dust mites  Dust mites are a microscopic type of insect that live in bedding, sofas, carpets, or any woven material. Dust mites do not bite and do not cause harm to humans, other than by triggering allergies. Mites absorb humidity from the atmosphere (ie, they do not drink) and feed on organic matter (including shed human and animal skin). They require sufficient humidity and nests to live in (which are not visible with the naked eye). Dust mite infestation is less common in dry climates, such as the [place]. Exposure to dust mites can be reduced by encasing pillows, mattresses, box springs, comforters, and furniture in mite-impermeable barriers. When covering crib mattresses and children's mattresses and pillows, only tight-fitting, commercial covers intended for this purpose should be used. Homemade covers (for example, plastic sheeting fastened with duct tape) should not be used in children's beds, as these can come apart, and children can become trapped or suffocate. Tightly-woven fabrics with a pore size of 6 microns or less are very effective at controlling the passage of mite as well as cat allergens. Fabrics with a pore size greater than 2 microns still permit airflow Mites can be eliminated by washing sheets and blankets weekly in warm water with detergent or by drying them in an electric dryer on the hot setting Exposure can be further reduced by vacuuming with a vacuum  equipped with a high-efficiency particulate air (HEPA) filter, dusting regularly, and not sleeping on upholstered furniture (eg, couches). However, studies have yet to show that physical or chemical cleaning methods reduce mite levels to a degree that improves symptoms Indoor humidity levels should be kept between 30 and 50 percent. Inexpensive humidity monitors can be purchased at most [employer] stores. Humidifiers make the problem worse and are not recommended. When possible, the amount of clutter, carpet, upholstered furniture, and drapes should be minimized, and horizontal blinds should be eliminated in the rooms where the person spends the most time (bedroom, study, television room). Washable vinyl, roller-type shades are optimal. For children, the number of stuffed toys in the bedroom should be minimized. Animal dander  Animal dander is made up of the dead skin cells or scales (like dandruff) that are constantly shed by animals. Any breed of dog or cat is capable of being allergenic, although the levels given off by individual animals may vary to some degree. In cats, the protein that causes most people's allergies is found in the cat's saliva, skin glands, and urinary/reproductive tract. Accordingly, short-haired cats are not necessarily less allergenic than long-haired animals, and furless cats have allergen levels similar to furred cats. Other animals, such as rodents, birds, and ferrets, can also trigger symptoms in an allergic individual. Pets without feathers or fur, such as reptiles, turtles, and fish, rarely cause allergy, although deposits of fish food that build up under the covers of fish tanks are an excellent source of food for dust mite colonies. If a person is found to be allergic to a pet, the most effective option is to remove the pet from the home. Limiting an animal to a certain area in the house is not effective, because allergens are carried on clothing or spread in the air. Once a pet has left a home, careful cleaning (or removal) of carpets, sofas, curtains, and bedding must follow. This is particularly true for cat allergens because they are "sticky" and adhere to a variety of indoor surfaces. Even after a cat has been removed from a home and it has been thoroughly cleaned, it can take months for the level of cat allergen to drop. For this reason, it may take months for the person's symptoms to fully reflect the absence of the pet. If it is not possible to remove the animal, measures can be taken to decrease exposure to the animal dander (although none of these methods are as effective as removing the animal. Vacuum  with a HEPA filter are effective in reducing cat and dog allergen levels in the home and can reduce symptoms Rodents  Mice and rats have proteins in their urine that can cause allergies. This applies to rodents that live in a laboratory setting, as well as rodents that live in the wild. To reduce rodent allergen levels significantly, a combination of pest control methods, in addition to pesticides (eg, poison baits), are usually necessary. This includes keeping food and trash in covered containers, cleaning food scraps from the floor and countertops, and sealing cracks in the walls, doors, and floors. Cockroaches  Cockroach droppings contain allergens that can trigger asthma and allergic rhinitis in sensitive individuals. Cockroaches thrive in warm, moist environments with easily accessible food and water. Unfortunately, efforts to control cockroach populations in infested areas are often less than successful. Still, certain measures are recommended: ?Use multiple baited traps or poisons ?Remove garbage and food waste promptly from the home ?Wash dishes and cooking utensils immediately after use ?Remove cockroach debris quickly ?Eliminate any standing water from leaking faucets or drains ?Keep humidity levels less than 50 percent with a dehumidifier or air conditioner ?Consult a professional  for large or recurrent infestations Asian ladybugs  Asian ladybugs were previously imported to the [place] as a biologic means of controlling aphids. It was anticipated that the insects would not survive the cold of winter. However, they adapted by moving inside houses when temperatures drop in the early fall. Allergies to Asian ladybugs have been increasingly reported as a source of seasonal indoor respiratory symptoms, particularly chronic cough, rhinitis, and asthma. Most cases have been reported in rural areas of the central, [place], and [place]. The insects can also bite and cause local reactions.  ladybugs enter homes through external cracks and crevices and then infest spaces within walls. They secrete a brown liquid that may stain walls and produce an unpleasant smell. Treating the exterior of the house with a chemical (pyrethroids) before cold weather arrives can prevent swarming of the ladybugs inside the home. Pyrethroids are similar to pyrethrins, which are derived from marigold flowers. Pyrethrins are broken down by the sun and do not significantly affect groundwater quality. Indoor molds  Mold spores can trigger symptoms of allergic rhinitis in allergic patients. Mold thrives in damp environments. Areas, such as air conditioning vents, water traps, refrigerator drip trays, shower stalls, leaky sinks, and damp basements, are particularly vulnerable to mold growth if not cleaned regularly. Most of the mold spores enter the home from the outside air. However, under certain circums

## 2024-08-29 NOTE — PAST MEDICAL HISTORY
[Menstruating] : menstruating [Menarche Age ____] : age at menarche was [unfilled] [Definite ___ (Date)] : the last menstrual period was [unfilled] [Regular Cycle Intervals] : have been regular [Total Preg ___] : G[unfilled] [Live Births ___] : P[unfilled]  [Full Term ___] : Full Term: [unfilled] [Living ___] : Living: [unfilled] [AB Induced ___] : elective abortions: [unfilled]

## 2024-08-29 NOTE — COUNSELING
[Fall prevention counseling provided] : Fall prevention counseling provided [Adequate lighting] : Adequate lighting [No throw rugs] : No throw rugs [Use proper foot wear] : Use proper foot wear [Sleep ___ hours/day] : Sleep [unfilled] hours/day [Engage in a relaxing activity] : Engage in a relaxing activity [Plan in advance] : Plan in advance [Potential consequences of obesity discussed] : Potential consequences of obesity discussed [Benefits of weight loss discussed] : Benefits of weight loss discussed [Structured Weight Management Program suggested:] : Structured weight management program suggested [Encouraged to maintain food diary] : Encouraged to maintain food diary [Encouraged to increase physical activity] : Encouraged to increase physical activity [Encouraged to use exercise tracking device] : Encouraged to use exercise tracking device [Target Wt Loss Goal ___] : Weight Loss Goals: Target weight loss goal [unfilled] lbs [Weigh Self Weekly] : weigh self weekly [Decrease Portions] : decrease portions [____ min/wk Activity] : [unfilled] min/wk activity [Keep Food Diary] : keep food diary [FreeTextEntry1] : low brenda  [FreeTextEntry2] : bmi 26 146 [None] : None

## 2024-08-29 NOTE — HEALTH RISK ASSESSMENT
[Excellent] : ~his/her~  mood as  excellent [Yes] : Yes [Monthly or less (1 pt)] : Monthly or less (1 point) [1 or 2 (0 pts)] : 1 or 2 (0 points) [Never (0 pts)] : Never (0 points) [No] : In the past 12 months have you used drugs other than those required for medical reasons? No [No falls in past year] : Patient reported no falls in the past year [Little interest or pleasure doing things] : 1) Little interest or pleasure doing things [Feeling down, depressed, or hopeless] : 2) Feeling down, depressed, or hopeless [0] : 2) Feeling down, depressed, or hopeless: Not at all (0) [PHQ-2 Negative - No further assessment needed] : PHQ-2 Negative - No further assessment needed [Never] : Never [YES] : Yes [Are there any children in your household?] : There are children in the household. [Patient reported mammogram was abnormal] : Patient reported mammogram was abnormal [Patient reported PAP Smear was normal] : Patient reported PAP Smear was normal [None] : None [With Family] : lives with family [# of Members in Household ___] :  household currently consist of [unfilled] member(s) [Employed] : employed [College] : College [] :  [# Of Children ___] : has [unfilled] children [Sexually Active] : sexually active [Feels Safe at Home] : Feels safe at home [Fully functional (bathing, dressing, toileting, transferring, walking, feeding)] : Fully functional (bathing, dressing, toileting, transferring, walking, feeding) [Fully functional (using the telephone, shopping, preparing meals, housekeeping, doing laundry, using] : Fully functional and needs no help or supervision to perform IADLs (using the telephone, shopping, preparing meals, housekeeping, doing laundry, using transportation, managing medications and managing finances) [Smoke Detector] : smoke detector [Carbon Monoxide Detector] : carbon monoxide detector [Safety elements used in home] : safety elements used in home [Seat Belt] :  uses seat belt [Sunscreen] : uses sunscreen [Aggressive treatment] : aggressive treatment [FreeTextEntry1] : none  [Audit-CScore] : 1 [de-identified] : goes to gym three times a week  [de-identified] : healthy  [[id number]] : 0 [Are there any unlocked firearms stored in your household?] : No unlocked firearms in the household. [Are there any firearms stored in your household that are loaded?] : No firearms are stored in the household loaded. [Has anyone in the household been feeling low/depressed/been struggling?] : No one in the household has been feeling low/depressed/been struggling. [Have you attended a firearm safety workshop or class?] : No firearm safety workshop or class has been attended. [Change in mental status noted] : No change in mental status noted [Language] : denies difficulty with language [Behavior] : denies difficulty with behavior [Learning/Retaining New Information] : denies difficulty learning/retaining new information [Handling Complex Tasks] : denies difficulty handling complex tasks [Reasoning] : denies difficulty with reasoning [Spatial Ability and Orientation] : denies difficulty with spatial ability and orientation [Reports changes in hearing] : Reports no changes in hearing [Reports changes in vision] : Reports no changes in vision [Reports changes in dental health] : Reports no changes in dental health [Travel to Developing Areas] : does not  travel to developing areas [TB Exposure] : is not being exposed to tuberculosis [Caregiver Concerns] : does not have caregiver concerns [MammogramDate] : 11/13/23 [MammogramComments] : mri of breast suggested  [PapSmearDate] : 2/24 [FreeTextEntry2] :   [de-identified] : last eye exam 8/23 [de-identified] : last dental 6/24 [AdvancecareDate] : 08/29/24

## 2024-09-02 DIAGNOSIS — R31.29 OTHER MICROSCOPIC HEMATURIA: ICD-10-CM

## 2024-09-02 LAB
25(OH)D3 SERPL-MCNC: 36.3 NG/ML
APPEARANCE: ABNORMAL
BILIRUBIN URINE: NEGATIVE
BLOOD URINE: ABNORMAL
COLOR: YELLOW
FERRITIN SERPL-MCNC: 15 NG/ML
GLUCOSE QUALITATIVE U: NEGATIVE MG/DL
KETONES URINE: NEGATIVE MG/DL
LEUKOCYTE ESTERASE URINE: NEGATIVE
NITRITE URINE: NEGATIVE
PH URINE: 5.5
PROTEIN URINE: NEGATIVE MG/DL
SPECIFIC GRAVITY URINE: 1.02
T PALLIDUM AB SER QL IA: NEGATIVE
UROBILINOGEN URINE: 0.2 MG/DL

## 2024-09-11 ENCOUNTER — APPOINTMENT (OUTPATIENT)
Dept: INTERNAL MEDICINE | Facility: CLINIC | Age: 39
End: 2024-09-11
Payer: COMMERCIAL

## 2024-09-11 DIAGNOSIS — B96.89 ACUTE VAGINITIS: ICD-10-CM

## 2024-09-11 DIAGNOSIS — N76.0 ACUTE VAGINITIS: ICD-10-CM

## 2024-09-11 PROCEDURE — 99441: CPT

## 2024-12-09 ENCOUNTER — APPOINTMENT (OUTPATIENT)
Dept: MAMMOGRAPHY | Facility: CLINIC | Age: 39
End: 2024-12-09
Payer: COMMERCIAL

## 2024-12-09 ENCOUNTER — APPOINTMENT (OUTPATIENT)
Dept: ULTRASOUND IMAGING | Facility: CLINIC | Age: 39
End: 2024-12-09
Payer: COMMERCIAL

## 2024-12-09 ENCOUNTER — RESULT REVIEW (OUTPATIENT)
Age: 39
End: 2024-12-09

## 2024-12-09 ENCOUNTER — OUTPATIENT (OUTPATIENT)
Dept: OUTPATIENT SERVICES | Facility: HOSPITAL | Age: 39
LOS: 1 days | End: 2024-12-09
Payer: COMMERCIAL

## 2024-12-09 DIAGNOSIS — Z00.00 ENCOUNTER FOR GENERAL ADULT MEDICAL EXAMINATION WITHOUT ABNORMAL FINDINGS: ICD-10-CM

## 2024-12-09 DIAGNOSIS — N63.0 UNSPECIFIED LUMP IN UNSPECIFIED BREAST: ICD-10-CM

## 2024-12-09 PROCEDURE — 76641 ULTRASOUND BREAST COMPLETE: CPT | Mod: 26,50

## 2024-12-09 PROCEDURE — 77063 BREAST TOMOSYNTHESIS BI: CPT

## 2024-12-09 PROCEDURE — 77067 SCR MAMMO BI INCL CAD: CPT | Mod: 26

## 2024-12-09 PROCEDURE — 76641 ULTRASOUND BREAST COMPLETE: CPT

## 2024-12-09 PROCEDURE — 77067 SCR MAMMO BI INCL CAD: CPT

## 2024-12-09 PROCEDURE — 77063 BREAST TOMOSYNTHESIS BI: CPT | Mod: 26

## 2025-01-23 ENCOUNTER — APPOINTMENT (OUTPATIENT)
Dept: INTERNAL MEDICINE | Facility: CLINIC | Age: 40
End: 2025-01-23

## 2025-09-02 ENCOUNTER — APPOINTMENT (OUTPATIENT)
Dept: INTERNAL MEDICINE | Facility: CLINIC | Age: 40
End: 2025-09-02

## 2025-09-02 VITALS
BODY MASS INDEX: 26.13 KG/M2 | SYSTOLIC BLOOD PRESSURE: 106 MMHG | WEIGHT: 142 LBS | TEMPERATURE: 97.3 F | OXYGEN SATURATION: 98 % | HEART RATE: 68 BPM | DIASTOLIC BLOOD PRESSURE: 66 MMHG | HEIGHT: 62 IN

## 2025-09-15 ENCOUNTER — APPOINTMENT (OUTPATIENT)
Dept: INTERNAL MEDICINE | Facility: CLINIC | Age: 40
End: 2025-09-15
Payer: COMMERCIAL

## 2025-09-15 VITALS
HEIGHT: 62 IN | SYSTOLIC BLOOD PRESSURE: 102 MMHG | HEART RATE: 58 BPM | BODY MASS INDEX: 26.13 KG/M2 | DIASTOLIC BLOOD PRESSURE: 72 MMHG | WEIGHT: 142 LBS | OXYGEN SATURATION: 98 %

## 2025-09-15 DIAGNOSIS — Z12.31 ENCOUNTER FOR SCREENING MAMMOGRAM FOR MALIGNANT NEOPLASM OF BREAST: ICD-10-CM

## 2025-09-15 DIAGNOSIS — Z13.29 ENCOUNTER FOR SCREENING FOR OTHER SUSPECTED ENDOCRINE DISORDER: ICD-10-CM

## 2025-09-15 DIAGNOSIS — N63.0 UNSPECIFIED LUMP IN UNSPECIFIED BREAST: ICD-10-CM

## 2025-09-15 DIAGNOSIS — D64.9 ANEMIA, UNSPECIFIED: ICD-10-CM

## 2025-09-15 DIAGNOSIS — Z11.3 ENCOUNTER FOR SCREENING FOR INFECTIONS WITH A PREDOMINANTLY SEXUAL MODE OF TRANSMISSION: ICD-10-CM

## 2025-09-15 DIAGNOSIS — R92.30 DENSE BREASTS, UNSPECIFIED: ICD-10-CM

## 2025-09-15 DIAGNOSIS — Z11.59 ENCOUNTER FOR SCREENING FOR OTHER VIRAL DISEASES: ICD-10-CM

## 2025-09-15 DIAGNOSIS — R01.1 CARDIAC MURMUR, UNSPECIFIED: ICD-10-CM

## 2025-09-15 DIAGNOSIS — E55.9 VITAMIN D DEFICIENCY, UNSPECIFIED: ICD-10-CM

## 2025-09-15 DIAGNOSIS — Z00.00 ENCOUNTER FOR GENERAL ADULT MEDICAL EXAMINATION W/OUT ABNORMAL FINDINGS: ICD-10-CM

## 2025-09-15 PROCEDURE — 99386 PREV VISIT NEW AGE 40-64: CPT

## 2025-09-15 PROCEDURE — 36415 COLL VENOUS BLD VENIPUNCTURE: CPT

## 2025-09-15 PROCEDURE — 93000 ELECTROCARDIOGRAM COMPLETE: CPT

## 2025-09-15 RX ORDER — NORGESTIMATE AND ETHINYL ESTRADIOL 0.25-0.035
0.25-35 KIT ORAL
Qty: 84 | Refills: 0 | Status: ACTIVE | COMMUNITY
Start: 2025-03-10

## 2025-09-17 LAB
25(OH)D3 SERPL-MCNC: 53.4 NG/ML
ALBUMIN SERPL ELPH-MCNC: 4.2 G/DL
ALP BLD-CCNC: 98 U/L
ALT SERPL-CCNC: 30 U/L
ANION GAP SERPL CALC-SCNC: 11 MMOL/L
AST SERPL-CCNC: 22 U/L
BASOPHILS # BLD AUTO: 0.02 K/UL
BASOPHILS NFR BLD AUTO: 0.2 %
BILIRUB SERPL-MCNC: 0.4 MG/DL
BUN SERPL-MCNC: 12 MG/DL
CALCIUM SERPL-MCNC: 9.4 MG/DL
CHLORIDE SERPL-SCNC: 104 MMOL/L
CHOLEST SERPL-MCNC: 175 MG/DL
CO2 SERPL-SCNC: 25 MMOL/L
CREAT SERPL-MCNC: 0.73 MG/DL
EGFRCR SERPLBLD CKD-EPI 2021: 105 ML/MIN/1.73M2
EOSINOPHIL # BLD AUTO: 0.07 K/UL
EOSINOPHIL NFR BLD AUTO: 0.8 %
ESTIMATED AVERAGE GLUCOSE: 120 MG/DL
FOLATE SERPL-MCNC: >20 NG/ML
GLUCOSE SERPL-MCNC: 95 MG/DL
HBA1C MFR BLD HPLC: 5.8 %
HBV SURFACE AB SER QL: REACTIVE
HBV SURFACE AG SER QL: NONREACTIVE
HCT VFR BLD CALC: 38.2 %
HCV AB SER QL: NONREACTIVE
HCV S/CO RATIO: 0.16 S/CO
HDLC SERPL-MCNC: 48 MG/DL
HGB BLD-MCNC: 12.2 G/DL
HIV1+2 AB SPEC QL IA.RAPID: NONREACTIVE
IMM GRANULOCYTES NFR BLD AUTO: 0.2 %
IRON SERPL-MCNC: 87 UG/DL
LDLC SERPL-MCNC: 103 MG/DL
LYMPHOCYTES # BLD AUTO: 3.5 K/UL
LYMPHOCYTES NFR BLD AUTO: 38.2 %
MAN DIFF?: NORMAL
MCHC RBC-ENTMCNC: 28.6 PG
MCHC RBC-ENTMCNC: 31.9 G/DL
MCV RBC AUTO: 89.5 FL
MONOCYTES # BLD AUTO: 0.45 K/UL
MONOCYTES NFR BLD AUTO: 4.9 %
NEUTROPHILS # BLD AUTO: 5.1 K/UL
NEUTROPHILS NFR BLD AUTO: 55.7 %
NONHDLC SERPL-MCNC: 127 MG/DL
PLATELET # BLD AUTO: 318 K/UL
POTASSIUM SERPL-SCNC: 4.7 MMOL/L
PROT SERPL-MCNC: 7.3 G/DL
RBC # BLD: 4.27 M/UL
RBC # FLD: 12.9 %
SODIUM SERPL-SCNC: 140 MMOL/L
T PALLIDUM AB SER QL IA: NEGATIVE
TRIGL SERPL-MCNC: 137 MG/DL
TSH SERPL-ACNC: 1.14 UIU/ML
VIT B12 SERPL-MCNC: 859 PG/ML
WBC # FLD AUTO: 9.16 K/UL

## 2025-09-18 ENCOUNTER — NON-APPOINTMENT (OUTPATIENT)
Age: 40
End: 2025-09-18